# Patient Record
Sex: MALE | Race: WHITE | NOT HISPANIC OR LATINO | Employment: OTHER | URBAN - METROPOLITAN AREA
[De-identification: names, ages, dates, MRNs, and addresses within clinical notes are randomized per-mention and may not be internally consistent; named-entity substitution may affect disease eponyms.]

---

## 2017-07-11 ENCOUNTER — HOSPITAL ENCOUNTER (OUTPATIENT)
Dept: RADIOLOGY | Facility: HOSPITAL | Age: 47
Discharge: HOME | End: 2017-07-11
Attending: SURGERY
Payer: COMMERCIAL

## 2017-07-11 ENCOUNTER — OFFICE VISIT (OUTPATIENT)
Dept: SURGERY | Facility: CLINIC | Age: 47
End: 2017-07-11
Payer: COMMERCIAL

## 2017-07-11 VITALS
HEART RATE: 61 BPM | BODY MASS INDEX: 27.62 KG/M2 | SYSTOLIC BLOOD PRESSURE: 130 MMHG | DIASTOLIC BLOOD PRESSURE: 84 MMHG | HEIGHT: 67 IN | WEIGHT: 176 LBS | TEMPERATURE: 99 F

## 2017-07-11 DIAGNOSIS — N50.89 TESTICULAR MASS: Primary | ICD-10-CM

## 2017-07-11 DIAGNOSIS — R10.32 INGUINAL PAIN OF BOTH SIDES: ICD-10-CM

## 2017-07-11 DIAGNOSIS — R10.31 INGUINAL PAIN OF BOTH SIDES: ICD-10-CM

## 2017-07-11 DIAGNOSIS — Z01.818 PREOP EXAMINATION: ICD-10-CM

## 2017-07-11 DIAGNOSIS — N50.89 TESTICULAR MASS: ICD-10-CM

## 2017-07-11 DIAGNOSIS — K40.20 NON-RECURRENT BILATERAL INGUINAL HERNIA WITHOUT OBSTRUCTION OR GANGRENE: ICD-10-CM

## 2017-07-11 PROCEDURE — 76870 US EXAM SCROTUM: CPT | Mod: 26,,, | Performed by: RADIOLOGY

## 2017-07-11 PROCEDURE — 99203 OFFICE O/P NEW LOW 30 MIN: CPT | Mod: S$GLB,,, | Performed by: SURGERY

## 2017-07-11 PROCEDURE — 99999 PR PBB SHADOW E&M-EST. PATIENT-LVL III: CPT | Mod: PBBFAC,,, | Performed by: SURGERY

## 2017-07-11 PROCEDURE — 76870 US EXAM SCROTUM: CPT | Mod: TC

## 2017-07-11 RX ORDER — HYDROXYZINE HYDROCHLORIDE 25 MG/1
TABLET, FILM COATED ORAL
COMMUNITY
Start: 2017-05-17 | End: 2017-08-11

## 2017-07-11 NOTE — PROGRESS NOTES
History & Physical    SUBJECTIVE:     History of Present Illness:  Patient is a 46 y.o. male presents with bilateral groin pain.  He has had bilateral groin pain in the past possibly associated with reducible masses and was well until July 4th when he played a lot of tennis, cleaned a grill and help with a neighbors yard when he developed pain that worsened over night.  The pain has improved and is 1/10 now and intermittent, changing with body position.  He sees a lump in the groins, greater on the left.      Chief Complaint   Patient presents with    Hernia     [facility]       Review of patient's allergies indicates:   Allergen Reactions    No known allergies        Current Outpatient Prescriptions   Medication Sig Dispense Refill    hydrOXYzine HCl (ATARAX) 25 MG tablet       tadalafil (CIALIS) 5 MG tablet Take 1 tablet (5 mg total) by mouth daily as needed for Erectile Dysfunction. 30 tablet 11     No current facility-administered medications for this visit.        History reviewed. No pertinent past medical history.  Past Surgical History:   Procedure Laterality Date    FOOT SURGERY      NASAL SEPTUM SURGERY      VASECTOMY       Family History   Problem Relation Age of Onset    Diabetes Father     Prostate cancer Neg Hx     Kidney disease Neg Hx      Social History   Substance Use Topics    Smoking status: Never Smoker    Smokeless tobacco: Never Used    Alcohol use Yes      Comment: asim        Review of Systems:  Review of Systems   Constitutional: Negative for fever and unexpected weight change.        Has night sweats and weight gain   Respiratory: Negative for cough, chest tightness and shortness of breath.    Cardiovascular: Negative for chest pain.   Gastrointestinal: Negative for abdominal pain, constipation, diarrhea, nausea and vomiting.   Genitourinary: Negative for difficulty urinating and dysuria.   Skin: Negative for rash and wound.   Neurological: Negative for seizures and headaches.  "  Hematological:        Has easy bruising but no easy bleeding       OBJECTIVE:     Vital Signs (Most Recent)  Temp: 98.6 °F (37 °C) (07/11/17 0825)  Pulse: 61 (07/11/17 0825)  BP: 130/84 (07/11/17 0825)  5' 7" (1.702 m)  79.8 kg (176 lb)     Physical Exam:  Physical Exam   Constitutional: He is oriented to person, place, and time. He appears well-developed and well-nourished.   Neck: Normal range of motion. Neck supple.   Abdominal: Soft. Bowel sounds are normal. He exhibits no distension. There is no tenderness.   Genitourinary: Right testis shows no mass. Left testis shows mass.         Neurological: He is alert and oriented to person, place, and time.   Skin: Skin is warm and dry.   Psychiatric: He has a normal mood and affect. His behavior is normal. Judgment and thought content normal.   Vitals reviewed.      Laboratory  None recent    Diagnostic Results:  None recent    ASSESSMENT/PLAN:     BIH, possible testicular mass    PLAN:Plan     Obtain testicular u/s.  For lap bih with mesh.       "

## 2017-07-14 ENCOUNTER — TELEPHONE (OUTPATIENT)
Dept: SURGERY | Facility: CLINIC | Age: 47
End: 2017-07-14

## 2017-07-14 NOTE — TELEPHONE ENCOUNTER
----- Message from Alfred Burton MD sent at 7/13/2017  5:40 PM CDT -----  Did you let him know his testicular mass is OK?  Otherwise, ready for lap [facility].

## 2017-07-14 NOTE — TELEPHONE ENCOUNTER
Pt notified of test results per Dr. Burton.  We will proceed with [facility] on 8/14/17.

## 2017-08-11 ENCOUNTER — TELEPHONE (OUTPATIENT)
Dept: SURGERY | Facility: CLINIC | Age: 47
End: 2017-08-11

## 2017-08-11 NOTE — TELEPHONE ENCOUNTER
Notified pt that we would be calling in reference to his surgery time after the cases were confirmed.  Pt verbalizes understanding, but would like a call from the preop dept prior to 1 d/t him going into a deposition at that time.   Explained to pt that I would have someone contact him.

## 2017-08-11 NOTE — TELEPHONE ENCOUNTER
----- Message from Brenda Castillo sent at 8/11/2017  8:32 AM CDT -----  Contact: self   Matt States that she needs a call returned by a nurse in reference to his surgery and wants top talk about how he is feeling. Please call Matt  @ [phone]  Or [phone]. Thanks :)

## 2017-08-12 NOTE — PRE-PROCEDURE INSTRUCTIONS
"Spoke with Patient.  NPO, medication, and pre-op instructions reviewed.  Denies previous problems with Anesthesia.  Is requesting to have Bedside Delivery for his post-op medications.  Concerned about post-op constipation.  Suggested that he try a stool softener and/or Miralax.  "I don't like pain."  Verbalized understanding of instructions.    "

## 2017-08-13 ENCOUNTER — ANESTHESIA EVENT (OUTPATIENT)
Dept: SURGERY | Facility: HOSPITAL | Age: 47
End: 2017-08-13
Payer: COMMERCIAL

## 2017-08-13 NOTE — ANESTHESIA PREPROCEDURE EVALUATION
[facility]  Anesthesia Pre-Operative Evaluation         Patient Name: Matt Manley  YOB: 1970  MRN: [id number]    SUBJECTIVE:     Pre-operative evaluation for Procedure(s) (LRB):  REPAIR-HERNIA-INGUINAL-BILATERAL-LAPAROSCOPIC w/ mesh (Bilateral)     08/13/2017    Matt Manley is a 47 y.o. male w/ no significant PMHx of  who presents for the above procedure.    Patient Active Problem List   Diagnosis    Adjustment disorder with mixed anxiety and depressed mood    Inguinal pain of both sides       Review of patient's allergies indicates:   Allergen Reactions    No known allergies        Current Inpatient Medications:      No current facility-administered medications on file prior to encounter.      No current outpatient prescriptions on file prior to encounter.       Past Surgical History:   Procedure Laterality Date    FOOT SURGERY      NASAL SEPTUM SURGERY      VASECTOMY         Social History     Social History    Marital status:      Spouse name: N/A    Number of children: N/A    Years of education: N/A     Occupational History    Not on file.     Social History Main Topics    Smoking status: Never Smoker    Smokeless tobacco: Never Used    Alcohol use Yes      Comment: beers    Drug use: No    Sexual activity: Yes     Partners: Female     Other Topics Concern    Not on file     Social History Narrative    No narrative on file       OBJECTIVE:     Vital Signs Range (Last 24H):         CBC:   No results for input(s): WBC, RBC, HGB, HCT, PLT, MCV, MCH, MCHC in the last 72 hours.    CMP: No results for input(s): NA, K, CL, CO2, BUN, CREATININE, GLU, MG, PHOS, CALCIUM, ALBUMIN, PROT, ALKPHOS, ALT, AST, BILITOT in the last 72 hours.    INR:  No results for input(s): INR, PROTIME, APTT in the last 72 hours.    Invalid input(s): PT    Diagnostic Studies: No relevant studies.    EKG: No recent studies available.    2D ECHO:  No results found for this or  any previous visit.      ASSESSMENT/PLAN:                                                                                                                 Anesthesia Evaluation    I have reviewed the Patient Summary Reports.    I have reviewed the Nursing Notes.   I have reviewed the Medications.     Review of Systems  Anesthesia Hx:  No problems with previous Anesthesia Denies Hx of Anesthetic complications  History of prior surgery of interest to airway management or planning: Denies Family Hx of Anesthesia complications.   Denies Personal Hx of Anesthesia complications.   EENT/Dental:EENT/Dental Normal   Cardiovascular:  Cardiovascular Normal     Pulmonary:  Pulmonary Normal    Renal/:  Renal/ Normal     Hepatic/GI:  Hepatic/GI Normal    Neurological:  Neurology Normal    Psych:   Psychiatric History          Physical Exam  General:  Well nourished    Airway/Jaw/Neck:  Airway Findings: Mouth Opening: Normal Tongue: Normal  Mallampati: II  Improves to I with phonation.        Eyes/Ears/Nose:  EYES/EARS/NOSE FINDINGS: Normal   Dental:  Dental Findings:    Chest/Lungs:  Chest/Lungs Findings: Clear to auscultation, Normal Respiratory Rate     Heart/Vascular:  Heart Findings: Rate: Normal  Rhythm: Regular Rhythm  Sounds: Normal     Abdomen:  Abdomen Findings: Normal    Musculoskeletal:  Musculoskeletal Findings: Normal   Skin:  Skin Findings: Normal    Mental Status:  Mental Status Findings: Normal        Anesthesia Plan  Type of Anesthesia, risks & benefits discussed:  Anesthesia Type:  general  Patient's Preference:   Intra-op Monitoring Plan: standard ASA monitors  Intra-op Monitoring Plan Comments:   Post Op Pain Control Plan: per primary service following discharge from PACU  Post Op Pain Control Plan Comments:   Induction:   IV  Beta Blocker:  Patient is not currently on a Beta-Blocker (No further documentation required).       Informed Consent: Patient understands risks and agrees with Anesthesia plan.   Questions answered. Anesthesia consent signed with patient.  ASA Score: 1     Day of Surgery Review of History & Physical:    H&P update referred to the surgeon.  H&P completed by Anesthesiologist.   Anesthesia Plan Notes:   47M B/L ing hernia for lap repair under GETA        Ready For Surgery From Anesthesia Perspective.

## 2017-08-14 ENCOUNTER — ANESTHESIA (OUTPATIENT)
Dept: SURGERY | Facility: HOSPITAL | Age: 47
End: 2017-08-14
Payer: COMMERCIAL

## 2017-08-14 ENCOUNTER — HOSPITAL ENCOUNTER (OUTPATIENT)
Facility: HOSPITAL | Age: 47
Discharge: HOME | End: 2017-08-14
Attending: SURGERY | Admitting: SURGERY
Payer: COMMERCIAL

## 2017-08-14 VITALS
DIASTOLIC BLOOD PRESSURE: 74 MMHG | HEART RATE: 57 BPM | RESPIRATION RATE: 18 BRPM | BODY MASS INDEX: 26.68 KG/M2 | OXYGEN SATURATION: 100 % | SYSTOLIC BLOOD PRESSURE: 133 MMHG | TEMPERATURE: 98 F | WEIGHT: 170 LBS | HEIGHT: 67 IN

## 2017-08-14 DIAGNOSIS — R10.32 INGUINAL PAIN OF BOTH SIDES: Primary | ICD-10-CM

## 2017-08-14 DIAGNOSIS — K40.20 NON-RECURRENT BILATERAL INGUINAL HERNIA WITHOUT OBSTRUCTION OR GANGRENE: ICD-10-CM

## 2017-08-14 DIAGNOSIS — R10.31 INGUINAL PAIN OF BOTH SIDES: Primary | ICD-10-CM

## 2017-08-14 DIAGNOSIS — K40.90 INGUINAL HERNIA UNILATERAL, NON-RECURRENT: ICD-10-CM

## 2017-08-14 PROCEDURE — 27000221 HC OXYGEN, UP TO 24 HOURS

## 2017-08-14 PROCEDURE — 25000003 PHARM REV CODE 250: Performed by: SURGERY

## 2017-08-14 PROCEDURE — 25000003 PHARM REV CODE 250: Performed by: STUDENT IN AN ORGANIZED HEALTH CARE EDUCATION/TRAINING PROGRAM

## 2017-08-14 PROCEDURE — 63600175 PHARM REV CODE 636 W HCPCS: Performed by: STUDENT IN AN ORGANIZED HEALTH CARE EDUCATION/TRAINING PROGRAM

## 2017-08-14 PROCEDURE — 49650 LAP ING HERNIA REPAIR INIT: CPT | Mod: 50,,, | Performed by: SURGERY

## 2017-08-14 PROCEDURE — 36000709 HC OR TIME LEV III EA ADD 15 MIN: Performed by: SURGERY

## 2017-08-14 PROCEDURE — 71000033 HC RECOVERY, INTIAL HOUR: Performed by: SURGERY

## 2017-08-14 PROCEDURE — D9220A PRA ANESTHESIA: Mod: ,,, | Performed by: ANESTHESIOLOGY

## 2017-08-14 PROCEDURE — 37000008 HC ANESTHESIA 1ST 15 MINUTES: Performed by: SURGERY

## 2017-08-14 PROCEDURE — 71000015 HC POSTOP RECOV 1ST HR: Performed by: SURGERY

## 2017-08-14 PROCEDURE — 36000708 HC OR TIME LEV III 1ST 15 MIN: Performed by: SURGERY

## 2017-08-14 PROCEDURE — 37000009 HC ANESTHESIA EA ADD 15 MINS: Performed by: SURGERY

## 2017-08-14 PROCEDURE — C1781 MESH (IMPLANTABLE): HCPCS | Performed by: SURGERY

## 2017-08-14 PROCEDURE — 27201423 OPTIME MED/SURG SUP & DEVICES STERILE SUPPLY: Performed by: SURGERY

## 2017-08-14 PROCEDURE — 71000016 HC POSTOP RECOV ADDL HR: Performed by: SURGERY

## 2017-08-14 DEVICE — MESH PROLENE 6INX6IN.: Type: IMPLANTABLE DEVICE | Site: GROIN | Status: FUNCTIONAL

## 2017-08-14 RX ORDER — GLYCOPYRROLATE 0.2 MG/ML
INJECTION INTRAMUSCULAR; INTRAVENOUS
Status: DISCONTINUED | OUTPATIENT
Start: 2017-08-14 | End: 2017-08-14

## 2017-08-14 RX ORDER — OXYCODONE AND ACETAMINOPHEN 5; 325 MG/1; MG/1
TABLET ORAL
Qty: 31 TABLET | Refills: 0 | Status: SHIPPED | OUTPATIENT
Start: 2017-08-14 | End: 2020-08-28

## 2017-08-14 RX ORDER — DEXAMETHASONE SODIUM PHOSPHATE 4 MG/ML
INJECTION, SOLUTION INTRA-ARTICULAR; INTRALESIONAL; INTRAMUSCULAR; INTRAVENOUS; SOFT TISSUE
Status: DISCONTINUED | OUTPATIENT
Start: 2017-08-14 | End: 2017-08-14

## 2017-08-14 RX ORDER — SODIUM CHLORIDE 0.9 % (FLUSH) 0.9 %
3 SYRINGE (ML) INJECTION
Status: DISCONTINUED | OUTPATIENT
Start: 2017-08-14 | End: 2017-08-14 | Stop reason: HOSPADM

## 2017-08-14 RX ORDER — SODIUM CHLORIDE 9 MG/ML
INJECTION, SOLUTION INTRAVENOUS CONTINUOUS
Status: DISCONTINUED | OUTPATIENT
Start: 2017-08-14 | End: 2017-08-14 | Stop reason: HOSPADM

## 2017-08-14 RX ORDER — ONDANSETRON 2 MG/ML
4 INJECTION INTRAMUSCULAR; INTRAVENOUS DAILY PRN
Status: DISCONTINUED | OUTPATIENT
Start: 2017-08-14 | End: 2017-08-14 | Stop reason: HOSPADM

## 2017-08-14 RX ORDER — LIDOCAINE HCL/PF 100 MG/5ML
SYRINGE (ML) INTRAVENOUS
Status: DISCONTINUED | OUTPATIENT
Start: 2017-08-14 | End: 2017-08-14

## 2017-08-14 RX ORDER — CEFAZOLIN SODIUM 1 G/3ML
INJECTION, POWDER, FOR SOLUTION INTRAMUSCULAR; INTRAVENOUS
Status: DISCONTINUED | OUTPATIENT
Start: 2017-08-14 | End: 2017-08-14

## 2017-08-14 RX ORDER — ACETAMINOPHEN 10 MG/ML
INJECTION, SOLUTION INTRAVENOUS
Status: DISCONTINUED | OUTPATIENT
Start: 2017-08-14 | End: 2017-08-14

## 2017-08-14 RX ORDER — ONDANSETRON 2 MG/ML
4 INJECTION INTRAMUSCULAR; INTRAVENOUS EVERY 12 HOURS PRN
Status: DISCONTINUED | OUTPATIENT
Start: 2017-08-14 | End: 2017-08-14 | Stop reason: HOSPADM

## 2017-08-14 RX ORDER — POLYETHYLENE GLYCOL 3350 17 G/17G
17 POWDER, FOR SOLUTION ORAL DAILY
Qty: 119 G | Refills: 0 | Status: SHIPPED | OUTPATIENT
Start: 2017-08-14 | End: 2020-08-28

## 2017-08-14 RX ORDER — OXYCODONE HYDROCHLORIDE 5 MG/1
5 TABLET ORAL EVERY 4 HOURS PRN
Status: DISCONTINUED | OUTPATIENT
Start: 2017-08-14 | End: 2017-08-14 | Stop reason: HOSPADM

## 2017-08-14 RX ORDER — ROCURONIUM BROMIDE 10 MG/ML
INJECTION, SOLUTION INTRAVENOUS
Status: DISCONTINUED | OUTPATIENT
Start: 2017-08-14 | End: 2017-08-14

## 2017-08-14 RX ORDER — NEOSTIGMINE METHYLSULFATE 1 MG/ML
INJECTION, SOLUTION INTRAVENOUS
Status: DISCONTINUED | OUTPATIENT
Start: 2017-08-14 | End: 2017-08-14

## 2017-08-14 RX ORDER — FENTANYL CITRATE 50 UG/ML
INJECTION, SOLUTION INTRAMUSCULAR; INTRAVENOUS
Status: DISCONTINUED | OUTPATIENT
Start: 2017-08-14 | End: 2017-08-14

## 2017-08-14 RX ORDER — KETOROLAC TROMETHAMINE 30 MG/ML
INJECTION, SOLUTION INTRAMUSCULAR; INTRAVENOUS
Status: DISCONTINUED | OUTPATIENT
Start: 2017-08-14 | End: 2017-08-14

## 2017-08-14 RX ORDER — MIDAZOLAM HYDROCHLORIDE 1 MG/ML
INJECTION, SOLUTION INTRAMUSCULAR; INTRAVENOUS
Status: DISCONTINUED | OUTPATIENT
Start: 2017-08-14 | End: 2017-08-14

## 2017-08-14 RX ORDER — LIDOCAINE HYDROCHLORIDE 10 MG/ML
1 INJECTION, SOLUTION EPIDURAL; INFILTRATION; INTRACAUDAL; PERINEURAL ONCE
Status: DISCONTINUED | OUTPATIENT
Start: 2017-08-14 | End: 2017-08-14 | Stop reason: HOSPADM

## 2017-08-14 RX ORDER — FENTANYL CITRATE 50 UG/ML
25 INJECTION, SOLUTION INTRAMUSCULAR; INTRAVENOUS EVERY 5 MIN PRN
Status: DISCONTINUED | OUTPATIENT
Start: 2017-08-14 | End: 2017-08-14 | Stop reason: HOSPADM

## 2017-08-14 RX ORDER — PROPOFOL 10 MG/ML
VIAL (ML) INTRAVENOUS
Status: DISCONTINUED | OUTPATIENT
Start: 2017-08-14 | End: 2017-08-14

## 2017-08-14 RX ORDER — OXYCODONE HYDROCHLORIDE 5 MG/1
10 TABLET ORAL EVERY 4 HOURS PRN
Status: DISCONTINUED | OUTPATIENT
Start: 2017-08-14 | End: 2017-08-14 | Stop reason: HOSPADM

## 2017-08-14 RX ORDER — BUPIVACAINE HYDROCHLORIDE 2.5 MG/ML
INJECTION, SOLUTION EPIDURAL; INFILTRATION; INTRACAUDAL
Status: DISCONTINUED | OUTPATIENT
Start: 2017-08-14 | End: 2017-08-14 | Stop reason: HOSPADM

## 2017-08-14 RX ORDER — ONDANSETRON 2 MG/ML
INJECTION INTRAMUSCULAR; INTRAVENOUS
Status: DISCONTINUED | OUTPATIENT
Start: 2017-08-14 | End: 2017-08-14

## 2017-08-14 RX ADMIN — LIDOCAINE HYDROCHLORIDE 40 MG: 20 INJECTION, SOLUTION INTRAVENOUS at 08:08

## 2017-08-14 RX ADMIN — DEXAMETHASONE SODIUM PHOSPHATE 8 MG: 4 INJECTION, SOLUTION INTRAMUSCULAR; INTRAVENOUS at 09:08

## 2017-08-14 RX ADMIN — SODIUM CHLORIDE: 0.9 INJECTION, SOLUTION INTRAVENOUS at 08:08

## 2017-08-14 RX ADMIN — SODIUM CHLORIDE, SODIUM GLUCONATE, SODIUM ACETATE, POTASSIUM CHLORIDE, MAGNESIUM CHLORIDE, SODIUM PHOSPHATE, DIBASIC, AND POTASSIUM PHOSPHATE: .53; .5; .37; .037; .03; .012; .00082 INJECTION, SOLUTION INTRAVENOUS at 09:08

## 2017-08-14 RX ADMIN — NEOSTIGMINE METHYLSULFATE 4 MG: 1 INJECTION INTRAVENOUS at 10:08

## 2017-08-14 RX ADMIN — KETOROLAC TROMETHAMINE 30 MG: 30 INJECTION, SOLUTION INTRAMUSCULAR; INTRAVENOUS at 09:08

## 2017-08-14 RX ADMIN — PROPOFOL 150 MG: 10 INJECTION, EMULSION INTRAVENOUS at 08:08

## 2017-08-14 RX ADMIN — ONDANSETRON 4 MG: 2 INJECTION INTRAMUSCULAR; INTRAVENOUS at 09:08

## 2017-08-14 RX ADMIN — MIDAZOLAM HYDROCHLORIDE 2 MG: 1 INJECTION, SOLUTION INTRAMUSCULAR; INTRAVENOUS at 08:08

## 2017-08-14 RX ADMIN — OXYCODONE HYDROCHLORIDE 10 MG: 5 TABLET ORAL at 10:08

## 2017-08-14 RX ADMIN — ROCURONIUM BROMIDE 15 MG: 10 INJECTION, SOLUTION INTRAVENOUS at 09:08

## 2017-08-14 RX ADMIN — EPHEDRINE SULFATE 5 MG: 50 INJECTION, SOLUTION INTRAMUSCULAR; INTRAVENOUS; SUBCUTANEOUS at 10:08

## 2017-08-14 RX ADMIN — SODIUM CHLORIDE: 0.9 INJECTION, SOLUTION INTRAVENOUS at 12:08

## 2017-08-14 RX ADMIN — FENTANYL CITRATE 25 MCG: 50 INJECTION INTRAMUSCULAR; INTRAVENOUS at 10:08

## 2017-08-14 RX ADMIN — ROCURONIUM BROMIDE 30 MG: 10 INJECTION, SOLUTION INTRAVENOUS at 08:08

## 2017-08-14 RX ADMIN — ACETAMINOPHEN 1000 MG: 10 INJECTION, SOLUTION INTRAVENOUS at 09:08

## 2017-08-14 RX ADMIN — EPHEDRINE SULFATE 5 MG: 50 INJECTION, SOLUTION INTRAMUSCULAR; INTRAVENOUS; SUBCUTANEOUS at 09:08

## 2017-08-14 RX ADMIN — GLYCOPYRROLATE 0.4 MG: 0.2 INJECTION, SOLUTION INTRAMUSCULAR; INTRAVENOUS at 10:08

## 2017-08-14 RX ADMIN — CEFAZOLIN 2 G: 1 INJECTION, POWDER, FOR SOLUTION INTRAVENOUS at 09:08

## 2017-08-14 RX ADMIN — FENTANYL CITRATE 100 MCG: 50 INJECTION, SOLUTION INTRAMUSCULAR; INTRAVENOUS at 08:08

## 2017-08-14 NOTE — TRANSFER OF CARE
"Anesthesia Transfer of Care Note    Patient: Matt Manley    Procedure(s) Performed: Procedure(s) (LRB):  REPAIR-HERNIA-INGUINAL-BILATERAL-LAPAROSCOPIC w/ mesh (Bilateral)    Patient location: PACU    Anesthesia Type: general    Transport from OR: Transported from OR on 6-10 L/min O2 by face mask with adequate spontaneous ventilation    Post pain: adequate analgesia    Post assessment: no apparent anesthetic complications    Post vital signs: stable    Level of consciousness: awake and alert    Nausea/Vomiting: no nausea/vomiting    Complications: none    Transfer of care protocol was followed      Last vitals:   Visit Vitals  /79 (BP Location: Left arm, Patient Position: Lying)   Pulse (!) 59   Temp 36.7 °C (98 °F)   Resp 18   Ht 5' 7" (1.702 m)   Wt 77.1 kg (170 lb)   SpO2 98%   BMI 26.63 kg/m²     "

## 2017-08-14 NOTE — DISCHARGE INSTRUCTIONS
Outpatient post-operative instructions:    Diet: many patients are nauseas the night of surgery.  Please stay on liquids until you are hungry for solid food which is usually the day after surgery.    Wounds: There are tape strips directly on your skin called steri-strips with a dressing or Band-Aids on top.  Remove the dressing or Band-Aids 2 days after surgery.  Please leave the steri-strips on until they fall off.  If they have not fallen off they will be removed in clinic.    Bathing: Please do not take any baths until after being seen by you doctor.  You may shower after the bandaids have been removed.  It is ok for the shower water to run onto the wounds.  Please pat them dry.  Activity:  For most laparoscopic cases patients must be light duty for 2 weeks.  If you have had a hernia repair you must continue light duty for 6 weeks.  Light duty means no lifting over 10 pounds (about a gallon of milk) or equivalent activity.  No golfing except putting, no fishing, no vacuuming and no mowing.  For exercise please keep to light exercise only.  No weight lifting or strenuous exercise.  Walking as much as you are comfortable to do is ok.  Driving is ok once you feel fully alert, off pain medication and have no pain bending or twisting.  Sex is ok once the pain has subsided and as long as it is not strenuous while you are on light duty.    Work: You may do light duty work when you feel up to it.  For most patients that is 1-2 weeks after surgery.  This is light duty only until you are off restrictions.        Discharge Instructions for Open Hernia Repair  You had a procedure called open hernia repair. Also called a rupture, a hernia is a tear or weakness in the wall of the belly. This weakness may be present at birth. Or it can be caused by the wear and tear of daily living. Hernias may get worse with time or with physical stress. But surgery can help repair the weakness and eliminate symptoms.  Activity after  surgery  Recommendations include the following:  · After surgery, take it easy for the rest of the day. If you had general anesthesia, dont use machinery or power tools, drink alcohol, or make any major decisions for at least the first 24 hours.  · Dont drive while you are still taking opioid pain medicine, and dont drive until you are able to step firmly on the brake pedal without hesitation.  · Ask others to help with chores and errands while you recover.  · Dont lift anything heavier than 10 pounds until your healthcare provider says its OK.  · Dont mow the lawn, use a vacuum , or do other strenuous activities until your healthcare provider says its OK.  · Walk as often as you feel able.  · Continue the coughing and deep breathing exercises that you learned in the hospital.  · Ask your healthcare provider when you can expect to return to work.  · Avoid constipation:  ¨ Eat fruits, vegetables, and whole grains.  ¨ Drink 6 to 8 glasses of water a day, unless otherwise instructed.  ¨ Use a laxative or a mild stool softener as instructed by your healthcare provider.  Bandage and incision care  Tips include the following:  · Do not get the bandage or wound wet for 48 hours.  · If strips of tape were used to close your incision, dont pull them off. Let them fall off on their own.  · Remove any gauze bandage in 48 hours.  · Wash your incision with mild soap and water. Pat it dry. Dont use oils, powders, or lotions on your incision. Do not soak your incision or take tub baths until cleared by your healthcare provider.  Follow-up care  Keep follow-up appointments during your recovery. These allow your healthcare provider to check your progress and make sure youre healing well. You may also need to have your stitches, staples, or bandage removed. During office visits, tell your healthcare provider if you have any new symptoms. And be sure to ask any questions you have.     When to call your healthcare  provider  Call your healthcare provider immediately if you have any of the following:  · A large amount of swelling or bruising (some testicular swelling and bruising is common)  · Bleeding  · Increasing pain  · Increased redness or drainage of the incision  · Fever of 100.4°F (38.0°C) or higher, or as directed by your healthcare provider  · Trouble urinating  · Nausea or vomiting   Date Last Reviewed: 7/1/2016  © 4423-8682 [employer]. [address] [place] [address], [place] [id number]. All rights reserved. This information is not intended as a substitute for professional medical care. Always follow your healthcare professional's instructions.

## 2017-08-14 NOTE — OP NOTE
DATE OF PROCEDURE: 8/14/2017    PRE OP DIAGNOSIS: Non-recurrent bilateral inguinal hernia without obstruction or gangrene [K40.20]    POST OP DIAGNOSIS: Non-recurrent bilateral inguinal hernia without obstruction or gangrene [K40.20]    PROCEDURE: Procedure(s) (LRB):  REPAIR-HERNIA-INGUINAL-BILATERAL-LAPAROSCOPIC w/ mesh (Bilateral)    Surgeon(s) and Role:     * Alfred Burton MD - Primary     * Rossy Juarez MD - Resident - Assisting  ANESTHESIA: General.     PROCEDURE IN DETAIL: The patient was placed under general anesthesia. [place]   was placed. The abdomen was prepped and draped in the usual manner. Access to   the preperitoneal space was gained by making an incision in the inferior   umbilicus, bluntly dissecting down to the rectus sheath, sharply incising   this and bluntly dissecting the rectus muscle to the posterior sheath and   placing a balloon dilator from this point to the pubic symphysis and dilating   under direct vision to less than 30 puffs. The balloon was removed, the trocar was left   in its place and pneumopreperitoneum to 8 mm of CO2 gas was obtained. Two 5 mm   trocars were placed in the lower midline under direct vision. Pubic symphysis   and ramus were dissected free on both sides. This was taken to the anterior   superior iliac spine on both sides. There were bilateral direct hernias, bilateral lipomas of cord, no indirect hernias and no femoral hernias.  These were brought deeply and freely into the preperitoneal space. 2 4 x 6 inch keyhole meshes were fashioned out of Prolene, placed in antibiotic solution, placed in the preperitoneal space and unfolded around the cord. They were tacked to close the keyhole on the pubic tubercle and ramus   along their most anterior borders and to the midline to each other. no overlay patch was placed. The preperitoneum was inspected for hemostasis. Trocars were removed under direct vision. Prior to removing the last trocar, pneumopreperitoneum  was allowed to escape and 20 mL of Marcaine solution instilled. The fascia at the naval was closed with 0-Vicryl.  Skin incisions were closed with 4-0 plain catgut, and reinforced with Mastisol, Steri-Strips and Band-Aids. The patient tolerated the procedure well and was brought to Recovery Room in stable condition. Sponge and needle counts were correct at the end of the case.    Blood loss: min Pathology: none Complications: none    Alfred Burton MD

## 2017-08-14 NOTE — BRIEF OP NOTE
Operative Note       Surgery Date: 8/14/2017     Surgeon(s) and Role:     * Alfred Burton MD - Primary     * Rossy Juarez MD - Resident - Assisting    Pre-op Diagnosis:  Non-recurrent bilateral inguinal hernia without obstruction or gangrene [K40.20]    Post-op Diagnosis:  Non-recurrent bilateral inguinal hernia without obstruction or gangrene [K40.20]    Procedure(s) (LRB):  REPAIR-HERNIA-INGUINAL-BILATERAL-LAPAROSCOPIC w/ mesh (Bilateral)    Anesthesia: General    Procedure in Detail/Findings:  Bih, repair with mesh.    Estimated Blood Loss: Minimal           Specimens     None        Implants:   Implant Name Type Inv. Item Serial No.  Lot No. LRB No. Used   MESH PROLENE [id number]. - [id number]   MESH PROLENE [id number].   [employer] [id number] Bilateral 2              Disposition: PACU - hemodynamically stable.           Condition: Good    Attestation:  I was present and scrubbed for the entire procedure.

## 2017-08-14 NOTE — H&P
History of Present Illness:  Patient is a 46 y.o. male presents with bilateral groin pain.  He has had bilateral groin pain in the past possibly associated with reducible masses and was well until July 4th when he played a lot of tennis, cleaned a grill and help with a neighbors yard when he developed pain that worsened over night.  The pain has improved and is 1/10 now and intermittent, changing with body position.  He sees a lump in the groins, greater on the left.  Since I last saw him he continues to have pain with activity.    History reviewed. No pertinent past medical history.    Past Surgical History:   Procedure Laterality Date    FOOT SURGERY      NASAL SEPTUM SURGERY      VASECTOMY         Family History   Problem Relation Age of Onset    Diabetes Father     Prostate cancer Neg Hx     Kidney disease Neg Hx        Social History     Social History    Marital status:      Spouse name: N/A    Number of children: N/A    Years of education: N/A     Social History Main Topics    Smoking status: Never Smoker    Smokeless tobacco: Never Used    Alcohol use Yes      Comment: beers    Drug use: No    Sexual activity: Yes     Partners: Female     Other Topics Concern    None     Social History Narrative    None       Current Facility-Administered Medications   Medication Dose Route Frequency Provider Last Rate Last Dose    0.9%  NaCl infusion   Intravenous Continuous Alfred Burton MD        CEFAZOLIN 2G/20 ML SYRINGE (PYXIS) IV syringe 2 g 20 mL  2 g Intravenous On Call Procedure Alfred Burton MD        lidocaine (PF) 10 mg/ml (1%) injection 10 mg  1 mL Intradermal Once Jose Turner MD           Review of patient's allergies indicates:   Allergen Reactions    No known allergies        PE: chest clear heart rrr abdomen benign    Impression: bih    Plan: lap bih with mesh

## 2017-08-14 NOTE — PROGRESS NOTES
AT BEDSIDE. PATIENTS HR IN MID TO HIGH 40'S. PREOP HR 59.  OK WITH HR. PATIENT IS ASYMPTOMATIC. NADN. NO N/V, CP, C/O MINIMAL PAIN TO ABDOMEN.

## 2017-08-14 NOTE — DISCHARGE SUMMARY
[facility]  General Surgery  Discharge Summary      Patient Name: Matt Manley  MRN: [id number]  Admission Date: 8/14/2017  Hospital Length of Stay: 0 days  Discharge Date and Time:  08/14/2017 1:49 PM  Attending Physician: Alfred Burton MD   Discharging Provider: Rossy Juarez MD  Primary Care Provider: Jose Alfredo Moreno MD (Inactive)     HPI: admitted for surgery    Procedure(s) (LRB):  REPAIR-HERNIA-INGUINAL-BILATERAL-LAPAROSCOPIC w/ mesh (Bilateral)     Hospital Course: Pt tolerated procedure well and had an uncomplicated post op course.      Consults:     Significant Diagnostic Studies: none    Pending Diagnostic Studies:     None        Final Active Diagnoses:    Diagnosis Date Noted POA    PRINCIPAL PROBLEM:  Inguinal hernia unilateral, non-recurrent [K40.90] 08/14/2017 Yes      Problems Resolved During this Admission:    Diagnosis Date Noted Date Resolved POA      Discharged Condition: good    Disposition: Home or Self Care    Follow Up:  Follow-up Information     Alfred Burton MD. Schedule an appointment as soon as possible for a visit in 2 weeks.    Specialties:  General Surgery, Bariatrics  Why:  For wound re-check  Contact information:  [address] [place] [id number]  [phone]                 Patient Instructions:     Diet general     Activity as tolerated     Shower on day dressing removed (No bath)     Lifting restrictions   Order Comments: No lifting greater than 10 pounds  No pushing/pulling such as vacuuming or raking.  No straining, avoid constipation and take stool softeners as described and laxatives as needed.  No driving while on narcotics and until you can react quickly without pain.     Call MD for:  temperature >100.4     Call MD for:  persistent nausea and vomiting     Call MD for:  severe uncontrolled pain     Call MD for:  difficulty breathing, headache or visual disturbances     Call MD for:  redness, tenderness, or signs of  infection (pain, swelling, redness, odor or green/yellow discharge around incision site)     Call MD for:  hives     Call MD for:  persistent dizziness or light-headedness     Call MD for:  extreme fatigue     Remove dressing in 48 hours   Order Comments: Steri strips will fall off on their own.       Medications:  Reconciled Home Medications:   Current Discharge Medication List      START taking these medications    Details   oxycodone-acetaminophen (PERCOCET) 5-325 mg per tablet Take one tablet by mouth every four hours as needed for pain  Qty: 31 tablet, Refills: 0      polyethylene glycol (GLYCOLAX) 17 gram/dose powder Take 17 g by mouth once daily.  Qty: 119 g, Refills: 0             Rossy Juarez MD  General Surgery  [facility]

## 2017-08-15 NOTE — ANESTHESIA POSTPROCEDURE EVALUATION
"Anesthesia Post Evaluation    Patient: Matt Manley    Procedure(s) Performed: Procedure(s) (LRB):  REPAIR-HERNIA-INGUINAL-BILATERAL-LAPAROSCOPIC w/ mesh (Bilateral)    Final Anesthesia Type: general  Patient location during evaluation: PACU  Patient participation: Yes- Able to Participate  Level of consciousness: awake and alert  Pain management: adequate  Airway patency: patent  PONV status at discharge: No PONV  Anesthetic complications: no      Cardiovascular status: blood pressure returned to baseline  Respiratory status: unassisted, spontaneous ventilation and room air  Hydration status: euvolemic  Follow-up not needed.        Visit Vitals  /74 (BP Location: Right arm, Patient Position: Lying)   Pulse (!) 57   Temp 36.7 °C (98 °F) (Temporal)   Resp 18   Ht 5' 7" (1.702 m)   Wt 77.1 kg (170 lb)   SpO2 100%   BMI 26.63 kg/m²       Pain/Gladys Score: Pain Assessment Performed: Yes (8/14/2017  1:30 PM)  Presence of Pain: other (see comments) (patient states pain is tolerable) (8/14/2017  1:30 PM)  Pain Rating Prior to Med Admin: 6 (8/14/2017 10:49 AM)  Pain Rating Post Med Admin: 4 (8/14/2017 11:00 AM)  Gladys Score: 10 (8/14/2017  1:30 PM)      "

## 2017-08-29 ENCOUNTER — OFFICE VISIT (OUTPATIENT)
Dept: SURGERY | Facility: CLINIC | Age: 47
End: 2017-08-29
Payer: COMMERCIAL

## 2017-08-29 VITALS
SYSTOLIC BLOOD PRESSURE: 140 MMHG | BODY MASS INDEX: 27.47 KG/M2 | WEIGHT: 175 LBS | HEART RATE: 66 BPM | DIASTOLIC BLOOD PRESSURE: 93 MMHG | HEIGHT: 67 IN

## 2017-08-29 DIAGNOSIS — Z09 POSTOP CHECK: Primary | ICD-10-CM

## 2017-08-29 PROBLEM — R10.32 INGUINAL PAIN OF BOTH SIDES: Status: RESOLVED | Noted: 2017-07-11 | Resolved: 2017-08-29

## 2017-08-29 PROBLEM — K40.90 INGUINAL HERNIA UNILATERAL, NON-RECURRENT: Status: RESOLVED | Noted: 2017-08-14 | Resolved: 2017-08-29

## 2017-08-29 PROBLEM — R10.31 INGUINAL PAIN OF BOTH SIDES: Status: RESOLVED | Noted: 2017-07-11 | Resolved: 2017-08-29

## 2017-08-29 PROCEDURE — 99999 PR PBB SHADOW E&M-EST. PATIENT-LVL III: CPT | Mod: PBBFAC,,, | Performed by: SURGERY

## 2017-08-29 PROCEDURE — 99024 POSTOP FOLLOW-UP VISIT: CPT | Mod: S$GLB,,, | Performed by: SURGERY

## 2017-08-29 NOTE — PROGRESS NOTES
"Matt Manley is a 47 y.o. male patient.   1. Postop check      History reviewed. No pertinent past medical history.  Past Surgical History Pertinent Negatives:   Procedure Date Noted    PROSTATE SURGERY 09/01/2015     Scheduled Meds:  Continuous Infusions:  PRN Meds:    Review of patient's allergies indicates:   Allergen Reactions    No known allergies      There are no hospital problems to display for this patient.    Blood pressure (!) 140/93, pulse 66, height 5' 7" (1.702 m), weight 79.4 kg (175 lb).    Subjective S/p lap bih with mesh 8/14/17.  He has no complaints.  Objective Abdomen benign, wounds clear, no hernias, small seroma on left.   Assessment & Plan He is happy with his procedure.  Light duty one more month and follow up one prn.       Alfred Burton MD  8/29/2017  "

## 2017-09-06 ENCOUNTER — PATIENT MESSAGE (OUTPATIENT)
Dept: SURGERY | Facility: CLINIC | Age: 47
End: 2017-09-06

## 2017-10-19 ENCOUNTER — PATIENT MESSAGE (OUTPATIENT)
Dept: ADMINISTRATIVE | Facility: OTHER | Age: 47
End: 2017-10-19

## 2020-02-01 ENCOUNTER — OFFICE VISIT (OUTPATIENT)
Dept: URGENT CARE | Facility: CLINIC | Age: 50
End: 2020-02-01
Payer: COMMERCIAL

## 2020-02-01 VITALS
RESPIRATION RATE: 18 BRPM | HEART RATE: 76 BPM | HEIGHT: 67 IN | SYSTOLIC BLOOD PRESSURE: 124 MMHG | OXYGEN SATURATION: 100 % | TEMPERATURE: 99 F | DIASTOLIC BLOOD PRESSURE: 77 MMHG | WEIGHT: 180 LBS | BODY MASS INDEX: 28.25 KG/M2

## 2020-02-01 DIAGNOSIS — R05.9 COUGH: ICD-10-CM

## 2020-02-01 DIAGNOSIS — J10.1 INFLUENZA B: Primary | ICD-10-CM

## 2020-02-01 LAB
CTP QC/QA: YES
FLUAV AG NPH QL: NEGATIVE
FLUBV AG NPH QL: POSITIVE

## 2020-02-01 PROCEDURE — 99214 OFFICE O/P EST MOD 30 MIN: CPT | Mod: 25,S$GLB,, | Performed by: NURSE PRACTITIONER

## 2020-02-01 PROCEDURE — 87804 POCT INFLUENZA A/B: ICD-10-PCS | Mod: QW,S$GLB,, | Performed by: NURSE PRACTITIONER

## 2020-02-01 PROCEDURE — 99214 PR OFFICE/OUTPT VISIT, EST, LEVL IV, 30-39 MIN: ICD-10-PCS | Mod: 25,S$GLB,, | Performed by: NURSE PRACTITIONER

## 2020-02-01 PROCEDURE — 87804 INFLUENZA ASSAY W/OPTIC: CPT | Mod: QW,S$GLB,, | Performed by: NURSE PRACTITIONER

## 2020-02-01 RX ORDER — PROMETHAZINE HYDROCHLORIDE AND CODEINE PHOSPHATE 6.25; 1 MG/5ML; MG/5ML
5 SOLUTION ORAL EVERY 6 HOURS PRN
Qty: 60 ML | Refills: 0 | Status: SHIPPED | OUTPATIENT
Start: 2020-02-01 | End: 2020-08-28

## 2020-02-01 RX ORDER — TRIPROLIDINE/PSEUDOEPHEDRINE 2.5MG-60MG
600 TABLET ORAL
Status: COMPLETED | OUTPATIENT
Start: 2020-02-01 | End: 2020-02-01

## 2020-02-01 RX ORDER — OSELTAMIVIR PHOSPHATE 75 MG/1
75 CAPSULE ORAL 2 TIMES DAILY
Qty: 10 CAPSULE | Refills: 0 | Status: SHIPPED | OUTPATIENT
Start: 2020-02-01 | End: 2020-02-06

## 2020-02-01 RX ADMIN — Medication 600 MG: at 11:02

## 2020-02-01 NOTE — PROGRESS NOTES
"Subjective:       Patient ID: Matt Manley is a 49 y.o. male.    Vitals:  height is 5' 7" (1.702 m) and weight is 81.6 kg (180 lb). His oral temperature is 99.4 °F (37.4 °C). His blood pressure is 124/77 and his pulse is 76. His respiration is 18 and oxygen saturation is 100%.     Chief Complaint: URI    This is a 49 y.o. male who presents today with a chief complaint of chest congestion with coughing, sinus headaches, ear pain, sore throat and fever of 100.0 F. Took Delsym 12 hour cough syrup and Advil. No flu vaccine. He wants to rule out the flu. Symptoms since 1/29/20.    URI    This is a new problem. The current episode started in the past 7 days. The problem has been gradually worsening. The maximum temperature recorded prior to his arrival was 100.4 - 100.9 F. Associated symptoms include congestion, coughing, ear pain, headaches, a plugged ear sensation, sinus pain, a sore throat and wheezing. Pertinent negatives include no diarrhea, dysuria, nausea, rash, rhinorrhea, sneezing or vomiting. He has tried NSAIDs and increased fluids (Gatorade) for the symptoms. The treatment provided mild relief.       Constitution: Positive for activity change, chills, sweating, fatigue, fever and generalized weakness. Negative for appetite change and international travel in last 60 days.   HENT: Positive for ear pain, congestion, postnasal drip, sinus pain, sinus pressure and sore throat. Negative for tinnitus and voice change.    Neck: Negative for painful lymph nodes.   Cardiovascular: Positive for sob on exertion.   Eyes: Negative for eye redness.   Respiratory: Positive for cough and wheezing. Negative for chest tightness, sputum production, bloody sputum, COPD, shortness of breath, stridor and asthma.    Gastrointestinal: Negative for nausea, vomiting and diarrhea.   Genitourinary: Negative for dysuria.   Musculoskeletal: Negative for muscle ache.   Skin: Negative for rash.   Allergic/Immunologic: Negative for " seasonal allergies, asthma, sneezing, immunizations up-to-date and flu shot.   Neurological: Positive for dizziness, light-headedness and headaches. Negative for altered mental status.   Hematologic/Lymphatic: Negative for swollen lymph nodes.   Psychiatric/Behavioral: Negative for altered mental status and confusion.       Objective:      Physical Exam   Constitutional: He is oriented to person, place, and time. He appears well-developed and well-nourished. He is cooperative.  Non-toxic appearance. He does not have a sickly appearance. He appears ill. No distress.   HENT:   Head: Normocephalic and atraumatic.   Right Ear: Hearing, tympanic membrane, external ear and ear canal normal.   Left Ear: Hearing, tympanic membrane, external ear and ear canal normal.   Nose: Nose normal. No mucosal edema, rhinorrhea or nasal deformity. No epistaxis. Right sinus exhibits no maxillary sinus tenderness and no frontal sinus tenderness. Left sinus exhibits no maxillary sinus tenderness and no frontal sinus tenderness.   Mouth/Throat: Uvula is midline, oropharynx is clear and moist and mucous membranes are normal. No trismus in the jaw. Normal dentition. No uvula swelling. No oropharyngeal exudate, posterior oropharyngeal edema or posterior oropharyngeal erythema.   Eyes: Conjunctivae and lids are normal. No scleral icterus.   Neck: Trachea normal, full passive range of motion without pain and phonation normal. Neck supple. No neck rigidity. No edema and no erythema present.   Cardiovascular: Normal rate, regular rhythm, normal heart sounds, intact distal pulses and normal pulses.   Pulmonary/Chest: Effort normal and breath sounds normal. No respiratory distress. He has no decreased breath sounds. He has no wheezes. He has no rhonchi.   Abdominal: Normal appearance.   Musculoskeletal: Normal range of motion. He exhibits no edema or deformity.   Neurological: He is alert and oriented to person, place, and time. He exhibits normal  muscle tone. Coordination normal.   Skin: Skin is warm, dry, intact, not diaphoretic, not pale and no rash.   Psychiatric: He has a normal mood and affect. His speech is normal and behavior is normal. Judgment and thought content normal. Cognition and memory are normal.   Nursing note and vitals reviewed.        Assessment:       1. Influenza B    2. Cough        Plan:         Influenza B  -     ibuprofen 100 mg/5 mL suspension 600 mg  -     promethazine-codeine 6.25-10 mg/5 ml (PHENERGAN WITH CODEINE) 6.25-10 mg/5 mL syrup; Take 5 mLs by mouth every 6 (six) hours as needed for Cough.  Dispense: 60 mL; Refill: 0  -     oseltamivir (TAMIFLU) 75 MG capsule; Take 1 capsule (75 mg total) by mouth 2 (two) times daily. for 5 days  Dispense: 10 capsule; Refill: 0    Cough  -     POCT Influenza A/B  -     promethazine-codeine 6.25-10 mg/5 ml (PHENERGAN WITH CODEINE) 6.25-10 mg/5 mL syrup; Take 5 mLs by mouth every 6 (six) hours as needed for Cough.  Dispense: 60 mL; Refill: 0

## 2020-02-01 NOTE — PATIENT INSTRUCTIONS
Return to Urgent Care or go to ER if symptoms worsen or fail to improve.  Follow up with PCP as recommended for further management.     Use pseudoephedrine (behind the counter) to decongest-- (the little red pills).  Pseudoephedrine 30 mg up to 240 mg /day. It can raise your blood pressure and give you palpitations.  Do not buy any oral medications with phenylephrine as it has not been proven effective as a decongestant at the OTC dose.     Take Ibuprofen or Acetaminophen according to label instructions for fever, throat pain, headache, and body aches    Use warm salt water gargles to ease your throat pain. Warm salt water gargles as needed for sore throat-  1/2 tsp salt to 1 cup warm water, gargle as desired.    Sometimes Nyquil at night is beneficial to help you get some rest, however it is sedating and does have an antihistamine, as well as tylenol.  The Nyquil behind the pharmacy counter also has the decongestant, pseudoephedrine as an additional ingredient.       Influenza (Adult)    Influenza is also called the flu. It is a viral illness that affects the air passages of your lungs. It is different from the common cold. The flu can easily be passed from one to person to another. It may be spread through the air by coughing and sneezing. Or it can be spread by touching the sick person and then touching your own eyes, nose, or mouth.  The flu starts 1 to 3 days after you are exposed to the flu virus. It may last for 1 to 2 weeks but many people feel tired or fatigued for many weeks afterward. You usually dont need to take antibiotics unless you have a complication. This might be an ear or sinus infection or pneumonia.  Symptoms of the flu may be mild or severe. They can include extreme tiredness (wanting to stay in bed all day), chills, fevers, muscle aches, soreness with eye movement, headache, and a dry, hacking cough.  Home care  Follow these guidelines when caring for yourself at home:  · Avoid being around  cigarette smoke, whether yours or other peoples.  · Acetaminophen or ibuprofen will help ease your fever, muscle aches, and headache. Dont give aspirin to anyone younger than 18 who has the flu. Aspirin can harm the liver.  · Nausea and loss of appetite are common with the flu. Eat light meals. Drink 6 to 8 glasses of liquids every day. Good choices are water, sport drinks, soft drinks without caffeine, juices, tea, and soup. Extra fluids will also help loosen secretions in your nose and lungs.  · Over-the-counter cold medicines will not make the flu go away faster. But the medicines may help with coughing, sore throat, and congestion in your nose and sinuses. Dont use a decongestant if you have high blood pressure.  · Stay home until your fever has been gone for at least 24 hours without using medicine to reduce fever.  Follow-up care  Follow up with your healthcare provider, or as advised, if you are not getting better over the next week.  If you are age 65 or older, talk with your provider about getting a pneumococcal vaccine every 5 years. You should also get this vaccine if you have chronic asthma or COPD. All adults should get a flu vaccine every fall. Ask your provider about this.  When to seek medical advice  Call your healthcare provider right away if any of these occur:  · Cough with lots of colored mucus (sputum) or blood in your mucus  · Chest pain, shortness of breath, wheezing, or trouble breathing  · Severe headache, or face, neck, or ear pain  · New rash with fever  · Fever of 100.4°F (38°C) or higher, or as directed by your healthcare provider  · Confusion, behavior change, or seizure  · Severe weakness or dizziness  · You get a new fever or cough after getting better for a few days  Date Last Reviewed: 1/1/2017  © 1327-3084 [employer]. [address] [place] [address], [place] [address]. All rights reserved. This information is not intended as a substitute for professional medical care.  Always follow your healthcare professional's instructions.

## 2020-08-14 ENCOUNTER — PATIENT OUTREACH (OUTPATIENT)
Dept: ADMINISTRATIVE | Facility: HOSPITAL | Age: 50
End: 2020-08-14

## 2020-08-14 NOTE — PROGRESS NOTES
Health Maintenance Due   Topic Date Due    Hepatitis C Screening  1970    HIV Screening  07/16/1983    TETANUS VACCINE  07/16/1986    Lipid Panel  07/09/2017    Shingles Vaccine (1 of 2) 07/16/2018    Colorectal Cancer Screening  07/16/2018     Chart review completed.

## 2020-08-28 ENCOUNTER — IMMUNIZATION (OUTPATIENT)
Dept: PHARMACY | Facility: CLINIC | Age: 50
End: 2020-08-28

## 2020-08-28 ENCOUNTER — OFFICE VISIT (OUTPATIENT)
Dept: INTERNAL MEDICINE | Facility: CLINIC | Age: 50
End: 2020-08-28
Payer: COMMERCIAL

## 2020-08-28 ENCOUNTER — IMMUNIZATION (OUTPATIENT)
Dept: PHARMACY | Facility: CLINIC | Age: 50
End: 2020-08-28
Payer: COMMERCIAL

## 2020-08-28 ENCOUNTER — LAB VISIT (OUTPATIENT)
Dept: LAB | Facility: HOSPITAL | Age: 50
End: 2020-08-28
Attending: FAMILY MEDICINE
Payer: COMMERCIAL

## 2020-08-28 VITALS
HEIGHT: 67 IN | HEART RATE: 67 BPM | DIASTOLIC BLOOD PRESSURE: 80 MMHG | WEIGHT: 183.63 LBS | BODY MASS INDEX: 28.82 KG/M2 | OXYGEN SATURATION: 98 % | SYSTOLIC BLOOD PRESSURE: 130 MMHG

## 2020-08-28 DIAGNOSIS — Z76.89 ENCOUNTER TO ESTABLISH CARE WITH NEW DOCTOR: Primary | ICD-10-CM

## 2020-08-28 DIAGNOSIS — E78.5 DYSLIPIDEMIA: ICD-10-CM

## 2020-08-28 DIAGNOSIS — Z12.11 COLON CANCER SCREENING: ICD-10-CM

## 2020-08-28 DIAGNOSIS — Z76.89 ENCOUNTER TO ESTABLISH CARE WITH NEW DOCTOR: ICD-10-CM

## 2020-08-28 LAB
25(OH)D3+25(OH)D2 SERPL-MCNC: 36 NG/ML (ref 30–96)
ALBUMIN SERPL BCP-MCNC: 4 G/DL (ref 3.5–5.2)
ALP SERPL-CCNC: 86 U/L (ref 55–135)
ALT SERPL W/O P-5'-P-CCNC: 35 U/L (ref 10–44)
ANION GAP SERPL CALC-SCNC: 9 MMOL/L (ref 8–16)
AST SERPL-CCNC: 23 U/L (ref 10–40)
BASOPHILS # BLD AUTO: 0.04 K/UL (ref 0–0.2)
BASOPHILS NFR BLD: 1 % (ref 0–1.9)
BILIRUB SERPL-MCNC: 0.6 MG/DL (ref 0.1–1)
BUN SERPL-MCNC: 24 MG/DL (ref 6–20)
CALCIUM SERPL-MCNC: 9.4 MG/DL (ref 8.7–10.5)
CHLORIDE SERPL-SCNC: 106 MMOL/L (ref 95–110)
CHOLEST SERPL-MCNC: 247 MG/DL (ref 120–199)
CHOLEST/HDLC SERPL: 4.6 {RATIO} (ref 2–5)
CO2 SERPL-SCNC: 27 MMOL/L (ref 23–29)
COMPLEXED PSA SERPL-MCNC: 0.47 NG/ML (ref 0–4)
CREAT SERPL-MCNC: 1.2 MG/DL (ref 0.5–1.4)
DIFFERENTIAL METHOD: ABNORMAL
EOSINOPHIL # BLD AUTO: 0.1 K/UL (ref 0–0.5)
EOSINOPHIL NFR BLD: 3.3 % (ref 0–8)
ERYTHROCYTE [DISTWIDTH] IN BLOOD BY AUTOMATED COUNT: 13 % (ref 11.5–14.5)
EST. GFR  (AFRICAN AMERICAN): >60 ML/MIN/1.73 M^2
EST. GFR  (NON AFRICAN AMERICAN): >60 ML/MIN/1.73 M^2
ESTIMATED AVG GLUCOSE: 108 MG/DL (ref 68–131)
GLUCOSE SERPL-MCNC: 116 MG/DL (ref 70–110)
HBA1C MFR BLD HPLC: 5.4 % (ref 4–5.6)
HCT VFR BLD AUTO: 49.7 % (ref 40–54)
HDLC SERPL-MCNC: 54 MG/DL (ref 40–75)
HDLC SERPL: 21.9 % (ref 20–50)
HGB BLD-MCNC: 16.1 G/DL (ref 14–18)
IMM GRANULOCYTES # BLD AUTO: 0.02 K/UL (ref 0–0.04)
IMM GRANULOCYTES NFR BLD AUTO: 0.5 % (ref 0–0.5)
LDLC SERPL CALC-MCNC: 167.6 MG/DL (ref 63–159)
LYMPHOCYTES # BLD AUTO: 1.6 K/UL (ref 1–4.8)
LYMPHOCYTES NFR BLD: 40.6 % (ref 18–48)
MCH RBC QN AUTO: 30 PG (ref 27–31)
MCHC RBC AUTO-ENTMCNC: 32.4 G/DL (ref 32–36)
MCV RBC AUTO: 93 FL (ref 82–98)
MONOCYTES # BLD AUTO: 0.4 K/UL (ref 0.3–1)
MONOCYTES NFR BLD: 11 % (ref 4–15)
NEUTROPHILS # BLD AUTO: 1.7 K/UL (ref 1.8–7.7)
NEUTROPHILS NFR BLD: 43.6 % (ref 38–73)
NONHDLC SERPL-MCNC: 193 MG/DL
NRBC BLD-RTO: 0 /100 WBC
PLATELET # BLD AUTO: 188 K/UL (ref 150–350)
PMV BLD AUTO: 11.6 FL (ref 9.2–12.9)
POTASSIUM SERPL-SCNC: 4.5 MMOL/L (ref 3.5–5.1)
PROT SERPL-MCNC: 7 G/DL (ref 6–8.4)
RBC # BLD AUTO: 5.36 M/UL (ref 4.6–6.2)
SODIUM SERPL-SCNC: 142 MMOL/L (ref 136–145)
TRIGL SERPL-MCNC: 127 MG/DL (ref 30–150)
TSH SERPL DL<=0.005 MIU/L-ACNC: 1.64 UIU/ML (ref 0.4–4)
WBC # BLD AUTO: 3.99 K/UL (ref 3.9–12.7)

## 2020-08-28 PROCEDURE — 99396 PR PREVENTIVE VISIT,EST,40-64: ICD-10-PCS | Mod: S$GLB,,, | Performed by: FAMILY MEDICINE

## 2020-08-28 PROCEDURE — 3008F PR BODY MASS INDEX (BMI) DOCUMENTED: ICD-10-PCS | Mod: CPTII,S$GLB,, | Performed by: FAMILY MEDICINE

## 2020-08-28 PROCEDURE — 85025 COMPLETE CBC W/AUTO DIFF WBC: CPT

## 2020-08-28 PROCEDURE — 80053 COMPREHEN METABOLIC PANEL: CPT

## 2020-08-28 PROCEDURE — 83036 HEMOGLOBIN GLYCOSYLATED A1C: CPT

## 2020-08-28 PROCEDURE — 36415 COLL VENOUS BLD VENIPUNCTURE: CPT

## 2020-08-28 PROCEDURE — 99396 PREV VISIT EST AGE 40-64: CPT | Mod: S$GLB,,, | Performed by: FAMILY MEDICINE

## 2020-08-28 PROCEDURE — 84443 ASSAY THYROID STIM HORMONE: CPT

## 2020-08-28 PROCEDURE — 86703 HIV-1/HIV-2 1 RESULT ANTBDY: CPT

## 2020-08-28 PROCEDURE — 82306 VITAMIN D 25 HYDROXY: CPT

## 2020-08-28 PROCEDURE — 84153 ASSAY OF PSA TOTAL: CPT

## 2020-08-28 PROCEDURE — 99999 PR PBB SHADOW E&M-EST. PATIENT-LVL III: ICD-10-PCS | Mod: PBBFAC,,, | Performed by: FAMILY MEDICINE

## 2020-08-28 PROCEDURE — 86803 HEPATITIS C AB TEST: CPT

## 2020-08-28 PROCEDURE — 3008F BODY MASS INDEX DOCD: CPT | Mod: CPTII,S$GLB,, | Performed by: FAMILY MEDICINE

## 2020-08-28 PROCEDURE — 99999 PR PBB SHADOW E&M-EST. PATIENT-LVL III: CPT | Mod: PBBFAC,,, | Performed by: FAMILY MEDICINE

## 2020-08-28 PROCEDURE — 80061 LIPID PANEL: CPT

## 2020-08-28 NOTE — PATIENT INSTRUCTIONS
"Download the "ASCVD risk " jluis on our smartphone. Use this and your current cholesterol and blood pressure numbers to calculate your 10 year cardiovascular risk.     We discussed whole food plant based diets and the good outcomes from recent clinical trails. Recommended the book How Not to Die  by Jose Madrid M.D      Wt Readings from Last 9 Encounters:   08/28/20 83.3 kg (183 lb 10.3 oz)   02/01/20 81.6 kg (180 lb)   08/29/17 79.4 kg (175 lb)   08/14/17 77.1 kg (170 lb)   07/11/17 79.8 kg (176 lb)   09/01/15 70.3 kg (155 lb)   07/09/12 70.3 kg (155 lb)   10/25/11 74.6 kg (164 lb 5.7 oz)       "

## 2020-08-28 NOTE — PROGRESS NOTES
Subjective:       Patient ID: Matt Manley is a 50 y.o. male.  . Has gained weight. Wife/3 kids, one in 2nd year med school. Eats lots of animal products. No Mis/CVAs in family. Usually does tennis for exercise but not much during pandemic    Chief Complaint: No chief complaint on file.    Patient is here to establish care. He notes unexpected weight change.  Wt Readings from Last 9 Encounters:   02/01/20 81.6 kg (180 lb)   08/29/17 79.4 kg (175 lb)   08/14/17 77.1 kg (170 lb)   07/11/17 79.8 kg (176 lb)   09/01/15 70.3 kg (155 lb)   07/09/12 70.3 kg (155 lb)   10/25/11 74.6 kg (164 lb 5.7 oz)     Family History   Problem Relation Age of Onset    Diabetes Father     Prostate cancer Neg Hx     Kidney disease Neg Hx          Social History     Socioeconomic History    Marital status:      Spouse name: Not on file    Number of children: Not on file    Years of education: Not on file    Highest education level: Not on file   Occupational History    Not on file   Social Needs    Financial resource strain: Not hard at all    Food insecurity     Worry: Never true     Inability: Never true    Transportation needs     Medical: No     Non-medical: No   Tobacco Use    Smoking status: Never Smoker    Smokeless tobacco: Never Used   Substance and Sexual Activity    Alcohol use: Yes     Comment: beers    Drug use: No    Sexual activity: Yes     Partners: Female   Lifestyle    Physical activity     Days per week: 0 days     Minutes per session: Not on file    Stress: Not at all   Relationships    Social connections     Talks on phone: Three times a week     Gets together: Twice a week     Attends Zoroastrian service: Not on file     Active member of club or organization: Yes     Attends meetings of clubs or organizations: More than 4 times per year     Relationship status:    Other Topics Concern    Not on file   Social History Narrative    Not on file     Past Surgical History:    Procedure Laterality Date    FOOT SURGERY      [facility]    NASAL SEPTUM SURGERY      VASECTOMY           Patient Active Problem List   Diagnosis    Adjustment disorder with mixed anxiety and depressed mood    Dyslipidemia     Current Outpatient Medications on File Prior to Visit   Medication Sig Dispense Refill    [DISCONTINUED] flu vac [id number] 36mos up,PF, 60 mcg (15 mcg x 4)/0.5 mL Syrg To be administered by the [facility] 0.5 mL 0    [DISCONTINUED] oxycodone-acetaminophen (PERCOCET) 5-325 mg per tablet Take one tablet by mouth every four hours as needed for pain 31 tablet 0    [DISCONTINUED] polyethylene glycol (GLYCOLAX) 17 gram/dose powder Take 17 g by mouth once daily. 119 g 0    [DISCONTINUED] promethazine-codeine 6.25-10 mg/5 ml (PHENERGAN WITH CODEINE) 6.25-10 mg/5 mL syrup Take 5 mLs by mouth every 6 (six) hours as needed for Cough. 60 mL 0     No current facility-administered medications on file prior to visit.          Review of Systems   Constitutional: Positive for unexpected weight change. Negative for activity change.   HENT: Negative for hearing loss, rhinorrhea and trouble swallowing.    Eyes: Negative for discharge and visual disturbance.   Respiratory: Negative for chest tightness and wheezing.    Cardiovascular: Negative for chest pain and palpitations.   Gastrointestinal: Negative for blood in stool, constipation, diarrhea and vomiting.   Endocrine: Negative for polydipsia and polyuria.   Genitourinary: Negative for difficulty urinating, hematuria and urgency.   Musculoskeletal: Negative for arthralgias, joint swelling and neck pain.   Neurological: Negative for weakness and headaches.   Psychiatric/Behavioral: Negative for confusion and dysphoric mood.       Objective:      Physical Exam  Constitutional:       Appearance: He is well-developed.   HENT:      Head: Normocephalic and atraumatic.   Neck:      Musculoskeletal: Neck supple.   Cardiovascular:       Rate and Rhythm: Normal rate.      Heart sounds: Normal heart sounds.   Pulmonary:      Effort: No respiratory distress.      Breath sounds: Normal breath sounds. No wheezing or rales.   Abdominal:      Palpations: Abdomen is soft. There is no mass.      Tenderness: There is no abdominal tenderness. There is no guarding or rebound.      Hernia: No hernia is present.   Skin:     General: Skin is dry.   Neurological:      Mental Status: He is alert.   Psychiatric:         Behavior: Behavior normal.         Assessment:       1. Encounter to establish care with new doctor    2. Dyslipidemia    3. Colon cancer screening        Plan:       Matt Lakhani was seen today for annual exam.    Diagnoses and all orders for this visit:    Encounter to establish care with new doctor  -     CBC auto differential; Future  -     Lipid Panel; Future  -     TSH; Future  -     Hemoglobin A1C; Future  -     Comprehensive metabolic panel; Future  -     PSA, Screening; Future  -     Urinalysis; Future  -     Vitamin D; Future  -     Hepatitis C Antibody; Future  -     HIV 1/2 Ag/Ab (4th Gen); Future  -     Case request GI: COLONOSCOPY    Dyslipidemia  -     Lipid Panel; Future    Colon cancer screening  -     Case request GI: COLONOSCOPY

## 2020-08-31 LAB
HCV AB SERPL QL IA: NEGATIVE
HIV 1+2 AB+HIV1 P24 AG SERPL QL IA: NEGATIVE

## 2020-10-23 ENCOUNTER — TELEPHONE (OUTPATIENT)
Dept: INTERNAL MEDICINE | Facility: CLINIC | Age: 50
End: 2020-10-23

## 2020-10-23 NOTE — TELEPHONE ENCOUNTER
Appointment Request From: Matt Manley     With Provider: Kar Pereira MD [[facility]]     Preferred Date Range: 10/23/2020 - 10/23/2020     Preferred Times: Any Time     Reason for visit: low fever, constipation, problems passing stool, dizzy, headache , nausea     Comments: address symptoms     Patient stated that he thinks he has a bowel obstruction

## 2020-10-23 NOTE — TELEPHONE ENCOUNTER
----- Message from Jennifer Meeks sent at 10/23/2020  7:21 AM CDT -----  Regarding: Patient Portal Request  Appointment Request From: Matt Manley    With Provider: Kar Pereira MD [[facility]]    Preferred Date Range: 10/23/2020 - 10/23/2020    Preferred Times: Any Time    Reason for visit: low fever, constipation, problems passing stool, dizzy, headache , nausea    Comments: address symptoms

## 2020-10-26 ENCOUNTER — PATIENT MESSAGE (OUTPATIENT)
Dept: INTERNAL MEDICINE | Facility: CLINIC | Age: 50
End: 2020-10-26

## 2020-10-26 DIAGNOSIS — Z01.818 PRE-OP TESTING: ICD-10-CM

## 2020-10-26 DIAGNOSIS — Z12.11 SPECIAL SCREENING FOR MALIGNANT NEOPLASMS, COLON: Primary | ICD-10-CM

## 2020-10-26 RX ORDER — POLYETHYLENE GLYCOL 3350, SODIUM SULFATE ANHYDROUS, SODIUM BICARBONATE, SODIUM CHLORIDE, POTASSIUM CHLORIDE 236; 22.74; 6.74; 5.86; 2.97 G/4L; G/4L; G/4L; G/4L; G/4L
4 POWDER, FOR SOLUTION ORAL ONCE
Qty: 4000 ML | Refills: 0 | Status: SHIPPED | OUTPATIENT
Start: 2020-10-26 | End: 2020-10-26

## 2020-11-02 NOTE — TELEPHONE ENCOUNTER
Called the patient and spoke to him. He stated that he is feeling better now. He report no fever, no constipation, no dizziness, no headache, and no nausea. He said he doesn't feel like he needs to go to . He is scheduled for colonoscopy on 11/12. He said he will definitely call if he experience another episode, but for now he's good, and hopefully it stays good until he get his colonoscopy.

## 2020-11-09 ENCOUNTER — LAB VISIT (OUTPATIENT)
Dept: SPORTS MEDICINE | Facility: CLINIC | Age: 50
End: 2020-11-09
Payer: COMMERCIAL

## 2020-11-09 DIAGNOSIS — Z01.818 PRE-OP TESTING: ICD-10-CM

## 2020-11-09 LAB — SARS-COV-2 RNA RESP QL NAA+PROBE: NOT DETECTED

## 2020-11-09 PROCEDURE — U0003 INFECTIOUS AGENT DETECTION BY NUCLEIC ACID (DNA OR RNA); SEVERE ACUTE RESPIRATORY SYNDROME CORONAVIRUS 2 (SARS-COV-2) (CORONAVIRUS DISEASE [COVID-19]), AMPLIFIED PROBE TECHNIQUE, MAKING USE OF HIGH THROUGHPUT TECHNOLOGIES AS DESCRIBED BY CMS-2020-01-R: HCPCS

## 2020-11-12 ENCOUNTER — ANESTHESIA (OUTPATIENT)
Dept: ENDOSCOPY | Facility: HOSPITAL | Age: 50
End: 2020-11-12
Payer: COMMERCIAL

## 2020-11-12 ENCOUNTER — ANESTHESIA EVENT (OUTPATIENT)
Dept: ENDOSCOPY | Facility: HOSPITAL | Age: 50
End: 2020-11-12
Payer: COMMERCIAL

## 2020-11-12 ENCOUNTER — HOSPITAL ENCOUNTER (OUTPATIENT)
Facility: HOSPITAL | Age: 50
Discharge: HOME | End: 2020-11-12
Attending: COLON & RECTAL SURGERY | Admitting: COLON & RECTAL SURGERY
Payer: COMMERCIAL

## 2020-11-12 VITALS
SYSTOLIC BLOOD PRESSURE: 125 MMHG | RESPIRATION RATE: 16 BRPM | DIASTOLIC BLOOD PRESSURE: 82 MMHG | TEMPERATURE: 98 F | BODY MASS INDEX: 28.25 KG/M2 | HEART RATE: 54 BPM | OXYGEN SATURATION: 98 % | HEIGHT: 67 IN | WEIGHT: 180 LBS

## 2020-11-12 DIAGNOSIS — Z12.11 SCREENING FOR COLON CANCER: Primary | ICD-10-CM

## 2020-11-12 PROCEDURE — 25000003 PHARM REV CODE 250: Performed by: NURSE ANESTHETIST, CERTIFIED REGISTERED

## 2020-11-12 PROCEDURE — 37000009 HC ANESTHESIA EA ADD 15 MINS: Performed by: COLON & RECTAL SURGERY

## 2020-11-12 PROCEDURE — G0121 COLON CA SCRN NOT HI RSK IND: HCPCS | Performed by: COLON & RECTAL SURGERY

## 2020-11-12 PROCEDURE — E9220 PRA ENDO ANESTHESIA: ICD-10-PCS | Mod: ,,, | Performed by: NURSE ANESTHETIST, CERTIFIED REGISTERED

## 2020-11-12 PROCEDURE — 37000008 HC ANESTHESIA 1ST 15 MINUTES: Performed by: COLON & RECTAL SURGERY

## 2020-11-12 PROCEDURE — G0121 COLON CA SCRN NOT HI RSK IND: HCPCS | Mod: ,,, | Performed by: COLON & RECTAL SURGERY

## 2020-11-12 PROCEDURE — E9220 PRA ENDO ANESTHESIA: HCPCS | Mod: ,,, | Performed by: NURSE ANESTHETIST, CERTIFIED REGISTERED

## 2020-11-12 PROCEDURE — 63600175 PHARM REV CODE 636 W HCPCS: Performed by: NURSE ANESTHETIST, CERTIFIED REGISTERED

## 2020-11-12 PROCEDURE — G0121 COLON CA SCRN NOT HI RSK IND: ICD-10-PCS | Mod: ,,, | Performed by: COLON & RECTAL SURGERY

## 2020-11-12 RX ORDER — PROPOFOL 10 MG/ML
VIAL (ML) INTRAVENOUS
Status: DISCONTINUED | OUTPATIENT
Start: 2020-11-12 | End: 2020-11-12

## 2020-11-12 RX ORDER — SODIUM CHLORIDE 9 MG/ML
INJECTION, SOLUTION INTRAVENOUS CONTINUOUS PRN
Status: DISCONTINUED | OUTPATIENT
Start: 2020-11-12 | End: 2020-11-12

## 2020-11-12 RX ORDER — PROPOFOL 10 MG/ML
VIAL (ML) INTRAVENOUS CONTINUOUS PRN
Status: DISCONTINUED | OUTPATIENT
Start: 2020-11-12 | End: 2020-11-12

## 2020-11-12 RX ORDER — LIDOCAINE HCL/PF 100 MG/5ML
SYRINGE (ML) INTRAVENOUS
Status: DISCONTINUED | OUTPATIENT
Start: 2020-11-12 | End: 2020-11-12

## 2020-11-12 RX ORDER — SODIUM CHLORIDE 9 MG/ML
INJECTION, SOLUTION INTRAVENOUS CONTINUOUS
Status: DISCONTINUED | OUTPATIENT
Start: 2020-11-12 | End: 2020-11-12 | Stop reason: HOSPADM

## 2020-11-12 RX ADMIN — PROPOFOL 70 MG: 10 INJECTION, EMULSION INTRAVENOUS at 09:11

## 2020-11-12 RX ADMIN — PROPOFOL 30 MG: 10 INJECTION, EMULSION INTRAVENOUS at 09:11

## 2020-11-12 RX ADMIN — Medication 100 MG: at 09:11

## 2020-11-12 RX ADMIN — PROPOFOL 20 MG: 10 INJECTION, EMULSION INTRAVENOUS at 09:11

## 2020-11-12 RX ADMIN — SODIUM CHLORIDE: 0.9 INJECTION, SOLUTION INTRAVENOUS at 09:11

## 2020-11-12 RX ADMIN — PROPOFOL 150 MCG/KG/MIN: 10 INJECTION, EMULSION INTRAVENOUS at 09:11

## 2020-11-12 NOTE — [employer] PATIENT INSTRUCTIONS
Discharge Summary/Instructions after an Endoscopic Procedure  Patient Name: Matt Manley  Patient MRN: [id number]  Patient YOB: 1970 Thursday, November 12, 2020  Alfred Duval MD  RESTRICTIONS:  During your procedure today, you received medications for sedation.  These   medications may affect your judgment, balance and coordination.  Therefore,   for 24 hours, you have the following restrictions:   - DO NOT drive a car, operate machinery, make legal/financial decisions,   sign important papers or drink alcohol.    ACTIVITY:  Today: no heavy lifting, straining or running due to procedural   sedation/anesthesia.  The following day: return to full activity including work.  DIET:  Eat and drink normally unless instructed otherwise.     TREATMENT FOR COMMON SIDE EFFECTS:  - Mild abdominal pain, nausea, belching, bloating or excessive gas:  rest,   eat lightly and use a heating pad.  - Sore Throat: treat with throat lozenges and/or gargle with warm salt   water.  - Because air was used during the procedure, expelling large amounts of air   from your rectum or belching is normal.  - If a bowel prep was taken, you may not have a bowel movement for 1-3 days.    This is normal.  SYMPTOMS TO WATCH FOR AND REPORT TO YOUR PHYSICIAN:  1. Abdominal pain or bloating, other than gas cramps.  2. Chest pain.  3. Back pain.  4. Signs of infection such as: chills or fever occurring within 24 hours   after the procedure.  5. Rectal bleeding, which would show as bright red, maroon, or black stools.   (A tablespoon of blood from the rectum is not serious, especially if   hemorrhoids are present.)  6. Vomiting.  7. Weakness or dizziness.  GO DIRECTLY TO THE NEAREST EMERGENCY ROOM IF YOU HAVE ANY OF THE FOLLOWING:      Difficulty breathing              Chills and/or fever over 101 F   Persistent vomiting and/or vomiting blood   Severe abdominal pain   Severe chest pain   Black, tarry stools   Bleeding- more than one  tablespoon   Any other symptom or condition that you feel may need urgent attention  Your doctor recommends these additional instructions:  If any biopsies were taken, your doctors clinic will contact you in 1 to 2   weeks with any results.  - Discharge patient to home (ambulatory).   - Resume previous diet.   - Continue present medications.   - Repeat colonoscopy in 10 years for screening purposes.  For questions, problems or results please call your physician - Alfred Duval MD at Work:  [phone].  [facility], EMERGENCY ROOM PHONE NUMBER: [phone]  IF A COMPLICATION OR EMERGENCY SITUATION ARISES AND YOU ARE UNABLE TO REACH   YOUR PHYSICIAN - GO DIRECTLY TO THE EMERGENCY ROOM.  Alfred Duval MD  11/12/2020 10:16:22 AM  This report has been verified and signed electronically.  [employer]

## 2020-11-12 NOTE — ANESTHESIA POSTPROCEDURE EVALUATION
Anesthesia Post Evaluation    Patient: Matt Manley    Procedure(s) Performed: Procedure(s) (LRB):  COLONOSCOPY (N/A)    Final Anesthesia Type: general    Patient location during evaluation: GI PACU  Patient participation: Yes- Able to Participate  Level of consciousness: awake and alert  Post-procedure vital signs: reviewed and stable  Pain management: adequate  Airway patency: patent    PONV status at discharge: No PONV  Anesthetic complications: no      Cardiovascular status: stable  Respiratory status: unassisted and spontaneous ventilation  Hydration status: euvolemic  Follow-up not needed.          Vitals Value Taken Time   /82 11/12/20 1048   Temp 36.5 °C (97.7 °F) 11/12/20 1017   Pulse 54 11/12/20 1048   Resp 16 11/12/20 1048   SpO2 98 % 11/12/20 1048         Event Time   Out of Recovery 10:49:52         Pain/Gladys Score: Gladys Score: 10 (11/12/2020 10:30 AM)

## 2020-11-12 NOTE — TRANSFER OF CARE
"Anesthesia Transfer of Care Note    Patient: Matt Manley    Procedure(s) Performed: Procedure(s) (LRB):  COLONOSCOPY (N/A)    Patient location: PACU    Anesthesia Type: general    Transport from OR: Transported from OR on room air with adequate spontaneous ventilation    Post pain: adequate analgesia    Post assessment: no apparent anesthetic complications and tolerated procedure well    Post vital signs: stable    Level of consciousness: sedated and responds to stimulation    Nausea/Vomiting: no nausea/vomiting    Complications: none    Transfer of care protocol was followed      Last vitals:   Visit Vitals  BP (!) 157/90 (BP Location: Left arm, Patient Position: Lying)   Pulse 68   Temp 36.7 °C (98.1 °F) (Oral)   Resp 18   Ht 5' 7" (1.702 m)   Wt 81.6 kg (180 lb)   SpO2 98%   BMI 28.19 kg/m²     "

## 2020-11-12 NOTE — ANESTHESIA PREPROCEDURE EVALUATION
11/12/2020  Matt Manley is a 50 y.o., male.  History reviewed. No pertinent past medical history.  Past Surgical History:   Procedure Laterality Date    FOOT SURGERY      HERNIA REPAIR  2017    lap [facility] with mesh    NASAL SEPTUM SURGERY      VASECTOMY         Anesthesia Evaluation    I have reviewed the Patient Summary Reports.    I have reviewed the Nursing Notes.    I have reviewed the Medications.     Review of Systems  Anesthesia Hx:  No problems with previous Anesthesia  Neg history of prior surgery. Denies Family Hx of Anesthesia complications.   Denies Personal Hx of Anesthesia complications.   Hematology/Oncology:  Hematology Normal   Oncology Normal     EENT/Dental:EENT/Dental Normal   Cardiovascular:   hyperlipidemia    Pulmonary:  Pulmonary Normal    Renal/:  Renal/ Normal     Hepatic/GI:  Hepatic/GI Normal    Musculoskeletal:  Musculoskeletal Normal    Neurological:  Neurology Normal    Endocrine:  Endocrine Normal    Dermatological:  Skin Normal    Psych:   Psychiatric History anxiety depression          Physical Exam  General:  Well nourished    Airway/Jaw/Neck:  Airway Findings: Mouth Opening: Normal Tongue: Normal  General Airway Assessment: Adult  Mallampati: II  Improves to II with phonation.  TM Distance: Normal, at least 6 cm        Eyes/Ears/Nose:  EYES/EARS/NOSE FINDINGS: Normal   Dental:  DENTAL FINDINGS: Normal   Chest/Lungs:  Chest/Lungs Findings: Clear to auscultation, Normal Respiratory Rate     Heart/Vascular:  Heart Findings: Rate: Normal  Rhythm: Regular Rhythm  Sounds: Normal  Heart murmur: negative Vascular Findings: Normal    Abdomen:  Abdomen Findings: Normal    Musculoskeletal:  Musculoskeletal Findings: Normal   Skin:  Skin Findings: Normal    Mental Status:  Mental Status Findings: Normal        Anesthesia Plan  Type of Anesthesia, risks & benefits  discussed:  Anesthesia Type:  general  Patient's Preference: general  Intra-op Monitoring Plan: standard ASA monitors  Intra-op Monitoring Plan Comments:   Post Op Pain Control Plan:   Post Op Pain Control Plan Comments:   Induction:   IV  Beta Blocker:  Patient is not currently on a Beta-Blocker (No further documentation required).       Informed Consent: Patient understands risks and agrees with Anesthesia plan.  Questions answered. Anesthesia consent signed with patient.  ASA Score: 2     Day of Surgery Review of History & Physical: I have interviewed and examined the patient. I have reviewed the patient's H&P dated:    H&P update referred to the provider.         Ready For Surgery From Anesthesia Perspective.

## 2020-11-12 NOTE — H&P
Endoscopy H&P    Procedure : Colonoscopy      asymptomatic screening exam      No past medical history on file.  Sedation Problems: NO  Family History   Problem Relation Age of Onset    Diabetes Father     Prostate cancer Neg Hx     Kidney disease Neg Hx      Fam Hx of Sedation Problems: NO  Social History     Socioeconomic History    Marital status:      Spouse name: Not on file    Number of children: Not on file    Years of education: Not on file    Highest education level: Not on file   Occupational History    Not on file   Social Needs    Financial resource strain: Not hard at all    Food insecurity     Worry: Never true     Inability: Never true    Transportation needs     Medical: No     Non-medical: No   Tobacco Use    Smoking status: Never Smoker    Smokeless tobacco: Never Used   Substance and Sexual Activity    Alcohol use: Yes     Comment: beers    Drug use: No    Sexual activity: Yes     Partners: Female   Lifestyle    Physical activity     Days per week: 0 days     Minutes per session: Not on file    Stress: Not at all   Relationships    Social connections     Talks on phone: Three times a week     Gets together: Twice a week     Attends Scientologist service: Not on file     Active member of club or organization: Yes     Attends meetings of clubs or organizations: More than 4 times per year     Relationship status:    Other Topics Concern    Not on file   Social History Narrative    Not on file       Review of Systems - Negative except HPI    Respiratory ROS: negative  Cardiovascular ROS: negative  Gastrointestinal ROS: negative  Musculoskeletal ROS: negative  Neurological ROS: negative        Physical Exam:  General: no distress  Head: normocephalic  Airway:  normal oropharynx, airway normal  Neck: supple, symmetrical, trachea midline  Lungs:  clear to auscultation bilaterally and normal respiratory  effort  Heart: regular rate and rhythm, S1, S2 normal, no murmur, rub or gallop  Abdomen: soft, non-tender non-distented; bowel sounds normal; no masses,  no organomegaly  Extremities: no cyanosis or edema, or clubbing       Deep Sedation: Mallampati Score per anesthesia     SedationPlan :Propofol     ASA : II

## 2020-12-29 ENCOUNTER — IMMUNIZATION (OUTPATIENT)
Dept: PHARMACY | Facility: CLINIC | Age: 50
End: 2020-12-29
Payer: COMMERCIAL

## 2020-12-29 ENCOUNTER — IMMUNIZATION (OUTPATIENT)
Dept: PHARMACY | Facility: CLINIC | Age: 50
End: 2020-12-29

## 2021-10-04 ENCOUNTER — PATIENT MESSAGE (OUTPATIENT)
Dept: ADMINISTRATIVE | Facility: HOSPITAL | Age: 51
End: 2021-10-04

## 2022-03-16 ENCOUNTER — PATIENT MESSAGE (OUTPATIENT)
Dept: ADMINISTRATIVE | Facility: HOSPITAL | Age: 52
End: 2022-03-16
Payer: COMMERCIAL

## 2022-06-10 ENCOUNTER — PATIENT MESSAGE (OUTPATIENT)
Dept: ADMINISTRATIVE | Facility: HOSPITAL | Age: 52
End: 2022-06-10
Payer: COMMERCIAL

## 2022-06-10 ENCOUNTER — PATIENT OUTREACH (OUTPATIENT)
Dept: ADMINISTRATIVE | Facility: HOSPITAL | Age: 52
End: 2022-06-10
Payer: COMMERCIAL

## 2022-06-10 NOTE — PROGRESS NOTES
Health Maintenance Due   Topic Date Due    COVID-19 Vaccine (4 - Booster for Moderna series) 04/30/2022     Triggered LINKS & reconciled immunizations. Unable to update Care Everywhere. Checked for outside lab results in [facility]; no results found. Portal message sent asking pt to schedule annual visit with Dr. Pereira or update PCP. Chart review completed.

## 2023-08-24 ENCOUNTER — PATIENT MESSAGE (OUTPATIENT)
Dept: INTERNAL MEDICINE | Facility: CLINIC | Age: 53
End: 2023-08-24
Payer: COMMERCIAL

## 2023-08-24 DIAGNOSIS — M21.619 BUNION: Primary | ICD-10-CM

## 2023-08-29 ENCOUNTER — OFFICE VISIT (OUTPATIENT)
Dept: PODIATRY | Facility: CLINIC | Age: 53
End: 2023-08-29
Payer: COMMERCIAL

## 2023-08-29 ENCOUNTER — OFFICE VISIT (OUTPATIENT)
Dept: INTERNAL MEDICINE | Facility: CLINIC | Age: 53
End: 2023-08-29
Payer: COMMERCIAL

## 2023-08-29 ENCOUNTER — LAB VISIT (OUTPATIENT)
Dept: LAB | Facility: HOSPITAL | Age: 53
End: 2023-08-29
Payer: COMMERCIAL

## 2023-08-29 VITALS
BODY MASS INDEX: 29.17 KG/M2 | DIASTOLIC BLOOD PRESSURE: 86 MMHG | HEART RATE: 57 BPM | OXYGEN SATURATION: 99 % | HEIGHT: 67 IN | SYSTOLIC BLOOD PRESSURE: 140 MMHG | WEIGHT: 185.88 LBS

## 2023-08-29 VITALS
DIASTOLIC BLOOD PRESSURE: 87 MMHG | HEIGHT: 67 IN | SYSTOLIC BLOOD PRESSURE: 150 MMHG | BODY MASS INDEX: 29.79 KG/M2 | WEIGHT: 189.81 LBS | HEART RATE: 55 BPM

## 2023-08-29 DIAGNOSIS — R23.3 EASY BRUISING: ICD-10-CM

## 2023-08-29 DIAGNOSIS — Z12.5 SCREENING FOR PROSTATE CANCER: ICD-10-CM

## 2023-08-29 DIAGNOSIS — E78.5 DYSLIPIDEMIA: ICD-10-CM

## 2023-08-29 DIAGNOSIS — R03.0 ELEVATED BLOOD PRESSURE READING: ICD-10-CM

## 2023-08-29 DIAGNOSIS — Z00.00 ANNUAL PHYSICAL EXAM: Primary | ICD-10-CM

## 2023-08-29 DIAGNOSIS — Z00.00 ANNUAL PHYSICAL EXAM: ICD-10-CM

## 2023-08-29 DIAGNOSIS — B07.0 PLANTAR WART, LEFT FOOT: ICD-10-CM

## 2023-08-29 LAB
ALBUMIN SERPL BCP-MCNC: 4.3 G/DL (ref 3.5–5.2)
ALP SERPL-CCNC: 94 U/L (ref 55–135)
ALT SERPL W/O P-5'-P-CCNC: 41 U/L (ref 10–44)
ANION GAP SERPL CALC-SCNC: 9 MMOL/L (ref 8–16)
AST SERPL-CCNC: 25 U/L (ref 10–40)
BASOPHILS # BLD AUTO: 0.06 K/UL (ref 0–0.2)
BASOPHILS NFR BLD: 1.4 % (ref 0–1.9)
BILIRUB SERPL-MCNC: 0.7 MG/DL (ref 0.1–1)
BUN SERPL-MCNC: 18 MG/DL (ref 6–20)
CALCIUM SERPL-MCNC: 9.5 MG/DL (ref 8.7–10.5)
CHLORIDE SERPL-SCNC: 103 MMOL/L (ref 95–110)
CHOLEST SERPL-MCNC: 271 MG/DL (ref 120–199)
CHOLEST/HDLC SERPL: 4.2 {RATIO} (ref 2–5)
CO2 SERPL-SCNC: 26 MMOL/L (ref 23–29)
COMPLEXED PSA SERPL-MCNC: 0.76 NG/ML (ref 0–4)
CREAT SERPL-MCNC: 1.1 MG/DL (ref 0.5–1.4)
DIFFERENTIAL METHOD: NORMAL
EOSINOPHIL # BLD AUTO: 0.1 K/UL (ref 0–0.5)
EOSINOPHIL NFR BLD: 1.7 % (ref 0–8)
ERYTHROCYTE [DISTWIDTH] IN BLOOD BY AUTOMATED COUNT: 12.8 % (ref 11.5–14.5)
EST. GFR  (NO RACE VARIABLE): >60 ML/MIN/1.73 M^2
ESTIMATED AVG GLUCOSE: 111 MG/DL (ref 68–131)
FERRITIN SERPL-MCNC: 319 NG/ML (ref 20–300)
GLUCOSE SERPL-MCNC: 115 MG/DL (ref 70–110)
HBA1C MFR BLD: 5.5 % (ref 4–5.6)
HCT VFR BLD AUTO: 49.8 % (ref 40–54)
HDLC SERPL-MCNC: 64 MG/DL (ref 40–75)
HDLC SERPL: 23.6 % (ref 20–50)
HGB BLD-MCNC: 16.4 G/DL (ref 14–18)
IMM GRANULOCYTES # BLD AUTO: 0 K/UL (ref 0–0.04)
IMM GRANULOCYTES NFR BLD AUTO: 0 % (ref 0–0.5)
IRON SERPL-MCNC: 130 UG/DL (ref 45–160)
LDLC SERPL CALC-MCNC: 182.4 MG/DL (ref 63–159)
LYMPHOCYTES # BLD AUTO: 1.5 K/UL (ref 1–4.8)
LYMPHOCYTES NFR BLD: 37 % (ref 18–48)
MCH RBC QN AUTO: 30.3 PG (ref 27–31)
MCHC RBC AUTO-ENTMCNC: 32.9 G/DL (ref 32–36)
MCV RBC AUTO: 92 FL (ref 82–98)
MONOCYTES # BLD AUTO: 0.4 K/UL (ref 0.3–1)
MONOCYTES NFR BLD: 10.4 % (ref 4–15)
NEUTROPHILS # BLD AUTO: 2.1 K/UL (ref 1.8–7.7)
NEUTROPHILS NFR BLD: 49.5 % (ref 38–73)
NONHDLC SERPL-MCNC: 207 MG/DL
NRBC BLD-RTO: 0 /100 WBC
PLATELET # BLD AUTO: 197 K/UL (ref 150–450)
PMV BLD AUTO: 11.2 FL (ref 9.2–12.9)
POTASSIUM SERPL-SCNC: 4.2 MMOL/L (ref 3.5–5.1)
PROT SERPL-MCNC: 7.1 G/DL (ref 6–8.4)
RBC # BLD AUTO: 5.42 M/UL (ref 4.6–6.2)
SATURATED IRON: 34 % (ref 20–50)
SODIUM SERPL-SCNC: 138 MMOL/L (ref 136–145)
TOTAL IRON BINDING CAPACITY: 382 UG/DL (ref 250–450)
TRANSFERRIN SERPL-MCNC: 258 MG/DL (ref 200–375)
TRIGL SERPL-MCNC: 123 MG/DL (ref 30–150)
TSH SERPL DL<=0.005 MIU/L-ACNC: 1.52 UIU/ML (ref 0.4–4)
WBC # BLD AUTO: 4.14 K/UL (ref 3.9–12.7)

## 2023-08-29 PROCEDURE — 3077F PR MOST RECENT SYSTOLIC BLOOD PRESSURE >= 140 MM HG: ICD-10-PCS | Mod: CPTII,S$GLB,, | Performed by: NURSE PRACTITIONER

## 2023-08-29 PROCEDURE — 3008F BODY MASS INDEX DOCD: CPT | Mod: CPTII,S$GLB,, | Performed by: NURSE PRACTITIONER

## 2023-08-29 PROCEDURE — 36415 COLL VENOUS BLD VENIPUNCTURE: CPT | Performed by: NURSE PRACTITIONER

## 2023-08-29 PROCEDURE — 80053 COMPREHEN METABOLIC PANEL: CPT | Performed by: NURSE PRACTITIONER

## 2023-08-29 PROCEDURE — 3077F PR MOST RECENT SYSTOLIC BLOOD PRESSURE >= 140 MM HG: ICD-10-PCS | Mod: CPTII,S$GLB,, | Performed by: PODIATRIST

## 2023-08-29 PROCEDURE — 84153 ASSAY OF PSA TOTAL: CPT | Performed by: NURSE PRACTITIONER

## 2023-08-29 PROCEDURE — 3077F SYST BP >= 140 MM HG: CPT | Mod: CPTII,S$GLB,, | Performed by: PODIATRIST

## 2023-08-29 PROCEDURE — 82607 VITAMIN B-12: CPT | Performed by: NURSE PRACTITIONER

## 2023-08-29 PROCEDURE — 3079F PR MOST RECENT DIASTOLIC BLOOD PRESSURE 80-89 MM HG: ICD-10-PCS | Mod: CPTII,S$GLB,, | Performed by: PODIATRIST

## 2023-08-29 PROCEDURE — 99999 PR PBB SHADOW E&M-EST. PATIENT-LVL III: ICD-10-PCS | Mod: PBBFAC,,, | Performed by: PODIATRIST

## 2023-08-29 PROCEDURE — 3079F DIAST BP 80-89 MM HG: CPT | Mod: CPTII,S$GLB,, | Performed by: NURSE PRACTITIONER

## 2023-08-29 PROCEDURE — 3079F DIAST BP 80-89 MM HG: CPT | Mod: CPTII,S$GLB,, | Performed by: PODIATRIST

## 2023-08-29 PROCEDURE — 3079F PR MOST RECENT DIASTOLIC BLOOD PRESSURE 80-89 MM HG: ICD-10-PCS | Mod: CPTII,S$GLB,, | Performed by: NURSE PRACTITIONER

## 2023-08-29 PROCEDURE — 3077F SYST BP >= 140 MM HG: CPT | Mod: CPTII,S$GLB,, | Performed by: NURSE PRACTITIONER

## 2023-08-29 PROCEDURE — 3008F PR BODY MASS INDEX (BMI) DOCUMENTED: ICD-10-PCS | Mod: CPTII,S$GLB,, | Performed by: NURSE PRACTITIONER

## 2023-08-29 PROCEDURE — 80061 LIPID PANEL: CPT | Performed by: NURSE PRACTITIONER

## 2023-08-29 PROCEDURE — 1159F PR MEDICATION LIST DOCUMENTED IN MEDICAL RECORD: ICD-10-PCS | Mod: CPTII,S$GLB,, | Performed by: PODIATRIST

## 2023-08-29 PROCEDURE — 99999 PR PBB SHADOW E&M-EST. PATIENT-LVL III: CPT | Mod: PBBFAC,,, | Performed by: PODIATRIST

## 2023-08-29 PROCEDURE — 83036 HEMOGLOBIN GLYCOSYLATED A1C: CPT | Performed by: NURSE PRACTITIONER

## 2023-08-29 PROCEDURE — 99396 PR PREVENTIVE VISIT,EST,40-64: ICD-10-PCS | Mod: S$GLB,,, | Performed by: NURSE PRACTITIONER

## 2023-08-29 PROCEDURE — 82728 ASSAY OF FERRITIN: CPT | Performed by: NURSE PRACTITIONER

## 2023-08-29 PROCEDURE — 84466 ASSAY OF TRANSFERRIN: CPT | Performed by: NURSE PRACTITIONER

## 2023-08-29 PROCEDURE — 99203 OFFICE O/P NEW LOW 30 MIN: CPT | Mod: S$GLB,,, | Performed by: PODIATRIST

## 2023-08-29 PROCEDURE — 3008F PR BODY MASS INDEX (BMI) DOCUMENTED: ICD-10-PCS | Mod: CPTII,S$GLB,, | Performed by: PODIATRIST

## 2023-08-29 PROCEDURE — 99203 PR OFFICE/OUTPT VISIT, NEW, LEVL III, 30-44 MIN: ICD-10-PCS | Mod: S$GLB,,, | Performed by: PODIATRIST

## 2023-08-29 PROCEDURE — 3008F BODY MASS INDEX DOCD: CPT | Mod: CPTII,S$GLB,, | Performed by: PODIATRIST

## 2023-08-29 PROCEDURE — 99396 PREV VISIT EST AGE 40-64: CPT | Mod: S$GLB,,, | Performed by: NURSE PRACTITIONER

## 2023-08-29 PROCEDURE — 1159F MED LIST DOCD IN RCRD: CPT | Mod: CPTII,S$GLB,, | Performed by: PODIATRIST

## 2023-08-29 PROCEDURE — 1160F RVW MEDS BY RX/DR IN RCRD: CPT | Mod: CPTII,S$GLB,, | Performed by: PODIATRIST

## 2023-08-29 PROCEDURE — 99999 PR PBB SHADOW E&M-EST. PATIENT-LVL IV: ICD-10-PCS | Mod: PBBFAC,,, | Performed by: NURSE PRACTITIONER

## 2023-08-29 PROCEDURE — 84443 ASSAY THYROID STIM HORMONE: CPT | Performed by: NURSE PRACTITIONER

## 2023-08-29 PROCEDURE — 99999 PR PBB SHADOW E&M-EST. PATIENT-LVL IV: CPT | Mod: PBBFAC,,, | Performed by: NURSE PRACTITIONER

## 2023-08-29 PROCEDURE — 85025 COMPLETE CBC W/AUTO DIFF WBC: CPT | Performed by: NURSE PRACTITIONER

## 2023-08-29 PROCEDURE — 1160F PR REVIEW ALL MEDS BY PRESCRIBER/CLIN PHARMACIST DOCUMENTED: ICD-10-PCS | Mod: CPTII,S$GLB,, | Performed by: PODIATRIST

## 2023-08-29 NOTE — PROGRESS NOTES
Internal Medicine Annual Exam       CHIEF COMPLAINT     The patient, Matt Manley, who is a 53 y.o. male presents for an annual exam.    HPI     Here for annual - last seen by Dr Pereira 8/28/2020    Corns and plantar warts- followed by podiatry - treated this morning    HM-  Colonoscopy - 11/12/2020 repeat in 10 years (2030)   A1c-8/2020  Tdap-8/28/2020    Pt exercises 2 hours 4-5 times per week - plays tennis       Past Medical History:  Past Medical History:   Diagnosis Date    Anxiety        Past Surgical History:   Procedure Laterality Date    COLONOSCOPY N/A 11/12/2020    Procedure: COLONOSCOPY;  Surgeon: ALBERT Duval MD;  [facility] ([address] [place] [address]);  Service: Endoscopy;  Laterality: N/A;  covid test 11/9-[place]    FOOT SURGERY      HERNIA REPAIR  2017    lap [facility] with mesh    NASAL SEPTUM SURGERY      TONSILLECTOMY  1989    VASECTOMY          Family History   Problem Relation Age of Onset    No Known Problems Mother     Diabetes Father     Tuberculosis Paternal Grandfather     Prostate cancer Neg Hx     Kidney disease Neg Hx         Social History     Socioeconomic History    Marital status:    Tobacco Use    Smoking status: Never    Smokeless tobacco: Never   Substance and Sexual Activity    Alcohol use: Yes     Alcohol/week: 17.0 standard drinks of alcohol     Types: 4 Glasses of wine, 10 Cans of beer, 3 Shots of liquor per week     Comment: 6 beers nightly and ocasionally drinks wine and liquor socailly    Drug use: No    Sexual activity: Yes     Partners: Female   Social History Narrative    Plays tennis 4-5 times per week x 2 hours      Social Determinants of Health     Financial Resource Strain: Low Risk  (8/29/2023)    Overall Financial Resource Strain (CARDIA)     Difficulty of Paying Living Expenses: Not hard at all   Food Insecurity: No Food Insecurity (8/29/2023)    Hunger Vital Sign     Worried About Running Out of Food in the Last Year: Never true     Ran Out of Food in  the Last Year: Never true   Transportation Needs: No Transportation Needs (8/29/2023)    PRAPARE - Transportation     Lack of Transportation (Medical): No     Lack of Transportation (Non-Medical): No   Physical Activity: Sufficiently Active (8/29/2023)    Exercise Vital Sign     Days of Exercise per Week: 6 days     Minutes of Exercise per Session: 120 min   Stress: No Stress Concern Present (8/29/2023)    [employer] - Occupational Stress Questionnaire     Feeling of Stress : Only a little   Social Connections: Unknown (8/29/2023)    Social Connection and Isolation Panel [NHANES]     Frequency of Communication with Friends and Family: More than three times a week     Frequency of Social Gatherings with Friends and Family: More than three times a week     Active Member of Clubs or Organizations: Yes     Attends Club or Organization Meetings: More than 4 times per year     Marital Status:    Housing Stability: Low Risk  (8/29/2023)    Housing Stability Vital Sign     Unable to Pay for Housing in the Last Year: No     Number of Places Lived in the Last Year: 1     Unstable Housing in the Last Year: No        Social History     Tobacco Use   Smoking Status Never   Smokeless Tobacco Never        Allergies as of 08/29/2023 - Reviewed 08/29/2023   Allergen Reaction Noted    No known allergies  09/01/2015          Home Medications:  Prior to Admission medications    Not on File       Review of Systems:  Review of Systems   Constitutional:  Negative for chills, fatigue, fever and unexpected weight change.   HENT:  Negative for congestion, ear pain, hearing loss, postnasal drip and sinus pressure.    Eyes:  Negative for pain, redness and visual disturbance.   Respiratory:  Negative for cough and shortness of breath.    Cardiovascular:  Negative for chest pain and palpitations.   Gastrointestinal:  Negative for abdominal distention and abdominal pain.   Endocrine: Negative for polydipsia,  "polyphagia and polyuria.   Genitourinary:  Negative for dysuria, frequency, hematuria and urgency.   Musculoskeletal:  Negative for arthralgias, gait problem and myalgias.   Skin:  Negative for pallor and rash.   Allergic/Immunologic: Negative for environmental allergies and immunocompromised state.   Neurological:  Negative for dizziness, weakness, light-headedness and headaches.   Hematological:  Negative for adenopathy. Does not bruise/bleed easily.   Psychiatric/Behavioral:  Negative for behavioral problems, confusion and sleep disturbance. The patient is not nervous/anxious.        Health Maintainence:   Immunizations:  Health Maintenance         Date Due Completion Date    COVID-19 Vaccine (4 - Moderna series) 02/24/2022 12/30/2021    Hemoglobin A1c (Diabetic Prevention Screening) 08/28/2023 8/28/2020    Influenza Vaccine (1) 09/01/2023 12/30/2021    Lipid Panel 08/28/2025 8/28/2020    TETANUS VACCINE 08/28/2030 8/28/2020    Colorectal Cancer Screening 11/12/2030 11/12/2020             PHYSICAL EXAM     BP (!) 140/86 (BP Location: Left arm, Patient Position: Sitting, BP Method: Medium (Manual))   Pulse (!) 57   Ht 5' 7" (1.702 m)   Wt 84.3 kg (185 lb 13.6 oz)   SpO2 99%   BMI 29.11 kg/m²  Body mass index is 29.11 kg/m².    Physical Exam  Vitals reviewed.   Constitutional:       Appearance: He is well-developed.   HENT:      Head: Normocephalic.      Right Ear: External ear normal.      Left Ear: External ear normal.      Nose: Nose normal.      Mouth/Throat:      Pharynx: No oropharyngeal exudate.   Eyes:      Pupils: Pupils are equal, round, and reactive to light.   Neck:      Thyroid: No thyromegaly.      Vascular: No JVD.      Trachea: No tracheal deviation.   Cardiovascular:      Rate and Rhythm: Normal rate and regular rhythm.      Heart sounds: No murmur heard.     No friction rub. No gallop.   Pulmonary:      Effort: No respiratory distress.      Breath sounds: Normal breath sounds. No wheezing or " rales.   Chest:      Chest wall: No tenderness.   Abdominal:      General: Bowel sounds are normal. There is no distension.      Palpations: Abdomen is soft.      Tenderness: There is no abdominal tenderness.   Musculoskeletal:         General: No tenderness. Normal range of motion.   Lymphadenopathy:      Cervical: No cervical adenopathy.   Skin:     General: Skin is warm and dry.      Findings: No rash.   Neurological:      Mental Status: He is alert and oriented to person, place, and time.   Psychiatric:         Behavior: Behavior normal.         LABS     Lab Results   Component Value Date    HGBA1C 5.4 08/28/2020     CMP  Sodium   Date Value Ref Range Status   08/28/2020 142 136 - 145 mmol/L Final     Potassium   Date Value Ref Range Status   08/28/2020 4.5 3.5 - 5.1 mmol/L Final     Chloride   Date Value Ref Range Status   08/28/2020 106 95 - 110 mmol/L Final     CO2   Date Value Ref Range Status   08/28/2020 27 23 - 29 mmol/L Final     Glucose   Date Value Ref Range Status   08/28/2020 116 (H) 70 - 110 mg/dL Final     BUN   Date Value Ref Range Status   08/28/2020 24 (H) 6 - 20 mg/dL Final     Creatinine   Date Value Ref Range Status   08/28/2020 1.2 0.5 - 1.4 mg/dL Final     Calcium   Date Value Ref Range Status   08/28/2020 9.4 8.7 - 10.5 mg/dL Final     Total Protein   Date Value Ref Range Status   08/28/2020 7.0 6.0 - 8.4 g/dL Final     Albumin   Date Value Ref Range Status   08/28/2020 4.0 3.5 - 5.2 g/dL Final     Total Bilirubin   Date Value Ref Range Status   08/28/2020 0.6 0.1 - 1.0 mg/dL Final     Comment:     For infants and newborns, interpretation of results should be based  on gestational age, weight and in agreement with clinical  observations.  Premature Infant recommended reference ranges:  Up to 24 hours.............<8.0 mg/dL  Up to 48 hours............<12.0 mg/dL  3-5 days..................<15.0 mg/dL  6-29 days.................<15.0 mg/dL       Alkaline Phosphatase   Date Value Ref Range  Status   08/28/2020 86 55 - 135 U/L Final     AST   Date Value Ref Range Status   08/28/2020 23 10 - 40 U/L Final     ALT   Date Value Ref Range Status   08/28/2020 35 10 - 44 U/L Final     Anion Gap   Date Value Ref Range Status   08/28/2020 9 8 - 16 mmol/L Final     eGFR if    Date Value Ref Range Status   08/28/2020 >60.0 >60 mL/min/1.73 m^2 Final     eGFR if non    Date Value Ref Range Status   08/28/2020 >60.0 >60 mL/min/1.73 m^2 Final     Comment:     Calculation used to obtain the estimated glomerular filtration  rate (eGFR) is the CKD-EPI equation.        Lab Results   Component Value Date    WBC 3.99 08/28/2020    HGB 16.1 08/28/2020    HCT 49.7 08/28/2020    MCV 93 08/28/2020     08/28/2020     Lab Results   Component Value Date    CHOL 247 (H) 08/28/2020    CHOL 228 (H) 07/09/2012    CHOL 205 (H) 10/27/2011     Lab Results   Component Value Date    HDL 54 08/28/2020    HDL 64 07/09/2012    HDL 67 10/27/2011     Lab Results   Component Value Date    LDLCALC 167.6 (H) 08/28/2020    LDLCALC 145.0 07/09/2012    LDLCALC 117.0 10/27/2011     Lab Results   Component Value Date    TRIG 127 08/28/2020    TRIG 96 07/09/2012    TRIG 105 10/27/2011     Lab Results   Component Value Date    CHOLHDL 21.9 08/28/2020    CHOLHDL 28.1 07/09/2012    CHOLHDL 32.7 10/27/2011     Lab Results   Component Value Date    TSH 1.638 08/28/2020       ASSESSMENT/PLAN     Matt Manley is a 53 y.o. male    Annual physical exam- All age and gender related screenings discussed   -     CBC Auto Differential; Future; Expected date: 08/29/2023  -     Comprehensive Metabolic Panel; Future; Expected date: 08/29/2023  -     Hemoglobin A1C; Future; Expected date: 08/29/2023  -     Lipid Panel; Future; Expected date: 08/29/2023  -     TSH; Future; Expected date: 08/29/2023    Dyslipidemia- repeat FLP.   -     Lipid Panel; Future; Expected date: 08/29/2023    Easy bruising- check CBC, iron and B12   -      Vitamin B12 Deficiency Panel; Future; Expected date: 08/29/2023  -     Ferritin; Future; Expected date: 08/29/2023  -     Iron and TIBC; Future; Expected date: 08/29/2023    Screening for prostate cancer  -     PSA, SCREENING; Future; Expected date: 08/29/2023    Elevated blood pressure reading- will decrease etoh intake to 2 drinks per day. Decrease na intake. Cont exercise and healthy diet. Monitor BP at home and bring log f/u in 4 weeks       Follow up with PCP    Addie PEREZ, RUSTY, FNP-c   Department of Internal Medicine - [facility]  11:26 AM

## 2023-08-29 NOTE — PROGRESS NOTES
"Subjective:     Patient ID: Matt Manley is a 53 y.o. male.    Chief Complaint: Heel Pain (Left foot) and Callouses (Left foot)    Matt Lakhani is a 53 y.o. male who presents to the clinic complaining of hard skin on the bottom of the left foot. Has been present for several weeks. Scraped away some of the hard skin with pumice stone and skin shaver. Was not getting better so Matt Lakhani is inquiring about treatment options.    Vitals:    08/29/23 1044   BP: (!) 150/87   Pulse: (!) 55   Weight: 86.1 kg (189 lb 13.1 oz)   Height: 5' 7" (1.702 m)   PainSc:   1   PainLoc: Foot         Review of Systems   Constitutional: Negative for chills and fever.   Cardiovascular:  Negative for chest pain, claudication and leg swelling.   Respiratory:  Negative for cough and shortness of breath.    Skin:  Positive for suspicious lesions. Negative for dry skin and nail changes.   Musculoskeletal: Negative.    Gastrointestinal:  Negative for nausea and vomiting.   Neurological:  Negative for numbness and paresthesias.   Psychiatric/Behavioral:  Negative for altered mental status.         Objective:     Physical Exam  Vitals reviewed.   Constitutional:       Appearance: He is well-developed.   HENT:      Head: Normocephalic.   Cardiovascular:      Pulses:           Dorsalis pedis pulses are 2+ on the right side and 2+ on the left side.        Posterior tibial pulses are 2+ on the right side and 2+ on the left side.      Comments: CRT < 3 sec to tips of toes.    Pulmonary:      Effort: No respiratory distress.   Musculoskeletal:      Comments: Mild pain with palpation and lateral squeeze of HPK lesions L heel, see skin section.    Skin:     General: Skin is warm and dry.      Findings: No erythema.      Comments: Plantar L heel with 2 separate hyperkeratotic lesions with skin lines divergent and dark black capillaries embedded within the dermis. No open wound, bleeding, drainage or SOI.    Neurological:      Mental Status: He " is alert and oriented to person, place, and time.      Sensory: No sensory deficit.      Comments: Light touch, proprioception, and sharp/dull sensation are all intact bilaterally.     Psychiatric:         Behavior: Behavior normal.         Thought Content: Thought content normal.         Judgment: Judgment normal.           Assessment:      Encounter Diagnosis   Name Primary?    Plantar wart, left foot      Plan:     Matt Lakhani was seen today for heel pain and callouses.    Diagnoses and all orders for this visit:    Plantar wart, left foot  -     Ambulatory referral/consult to Podiatry      I counseled the patient on his conditions, their implications and medical management.    Discussed treatment options for plantar wart left heel.  We discussed over-the-counter salicylic acid and freeze spray as well as Cantharone acid application in clinic today.    Patient opted for over-the-counter treatment for now as he is very active and plays tennis and does not want a treatment option that may cause excessive pain/blistering.    Recommended OTC salicylic acid and freeze spray as per kit instructions (recommended Compound-W)    RTC 2-3 weeks, sooner p.r.n.

## 2023-08-30 LAB — VIT B12 SERPL-MCNC: 450 NG/L (ref 180–914)

## 2023-09-05 DIAGNOSIS — E78.5 DYSLIPIDEMIA: Primary | ICD-10-CM

## 2023-09-07 ENCOUNTER — PATIENT MESSAGE (OUTPATIENT)
Dept: INTERNAL MEDICINE | Facility: CLINIC | Age: 53
End: 2023-09-07
Payer: COMMERCIAL

## 2023-09-07 RX ORDER — ROSUVASTATIN CALCIUM 5 MG/1
5 TABLET, COATED ORAL DAILY
Qty: 90 TABLET | Refills: 1 | Status: SHIPPED | OUTPATIENT
Start: 2023-09-07 | End: 2024-09-06

## 2023-09-26 ENCOUNTER — OFFICE VISIT (OUTPATIENT)
Dept: INTERNAL MEDICINE | Facility: CLINIC | Age: 53
End: 2023-09-26
Payer: COMMERCIAL

## 2023-09-26 VITALS
HEIGHT: 67 IN | DIASTOLIC BLOOD PRESSURE: 84 MMHG | OXYGEN SATURATION: 98 % | SYSTOLIC BLOOD PRESSURE: 130 MMHG | BODY MASS INDEX: 29.31 KG/M2 | WEIGHT: 186.75 LBS | HEART RATE: 54 BPM

## 2023-09-26 DIAGNOSIS — I10 HYPERTENSION, UNSPECIFIED TYPE: Primary | ICD-10-CM

## 2023-09-26 DIAGNOSIS — E78.5 DYSLIPIDEMIA: ICD-10-CM

## 2023-09-26 PROCEDURE — 99214 PR OFFICE/OUTPT VISIT, EST, LEVL IV, 30-39 MIN: ICD-10-PCS | Mod: S$GLB,,, | Performed by: NURSE PRACTITIONER

## 2023-09-26 PROCEDURE — 99999 PR PBB SHADOW E&M-EST. PATIENT-LVL III: CPT | Mod: PBBFAC,,, | Performed by: NURSE PRACTITIONER

## 2023-09-26 PROCEDURE — 99214 OFFICE O/P EST MOD 30 MIN: CPT | Mod: S$GLB,,, | Performed by: NURSE PRACTITIONER

## 2023-09-26 PROCEDURE — 1159F PR MEDICATION LIST DOCUMENTED IN MEDICAL RECORD: ICD-10-PCS | Mod: CPTII,S$GLB,, | Performed by: NURSE PRACTITIONER

## 2023-09-26 PROCEDURE — 3079F DIAST BP 80-89 MM HG: CPT | Mod: CPTII,S$GLB,, | Performed by: NURSE PRACTITIONER

## 2023-09-26 PROCEDURE — 99999 PR PBB SHADOW E&M-EST. PATIENT-LVL III: ICD-10-PCS | Mod: PBBFAC,,, | Performed by: NURSE PRACTITIONER

## 2023-09-26 PROCEDURE — 3044F PR MOST RECENT HEMOGLOBIN A1C LEVEL <7.0%: ICD-10-PCS | Mod: CPTII,S$GLB,, | Performed by: NURSE PRACTITIONER

## 2023-09-26 PROCEDURE — 3075F SYST BP GE 130 - 139MM HG: CPT | Mod: CPTII,S$GLB,, | Performed by: NURSE PRACTITIONER

## 2023-09-26 PROCEDURE — 3008F BODY MASS INDEX DOCD: CPT | Mod: CPTII,S$GLB,, | Performed by: NURSE PRACTITIONER

## 2023-09-26 PROCEDURE — 3044F HG A1C LEVEL LT 7.0%: CPT | Mod: CPTII,S$GLB,, | Performed by: NURSE PRACTITIONER

## 2023-09-26 PROCEDURE — 3075F PR MOST RECENT SYSTOLIC BLOOD PRESS GE 130-139MM HG: ICD-10-PCS | Mod: CPTII,S$GLB,, | Performed by: NURSE PRACTITIONER

## 2023-09-26 PROCEDURE — 3008F PR BODY MASS INDEX (BMI) DOCUMENTED: ICD-10-PCS | Mod: CPTII,S$GLB,, | Performed by: NURSE PRACTITIONER

## 2023-09-26 PROCEDURE — 1159F MED LIST DOCD IN RCRD: CPT | Mod: CPTII,S$GLB,, | Performed by: NURSE PRACTITIONER

## 2023-09-26 PROCEDURE — 3079F PR MOST RECENT DIASTOLIC BLOOD PRESSURE 80-89 MM HG: ICD-10-PCS | Mod: CPTII,S$GLB,, | Performed by: NURSE PRACTITIONER

## 2023-09-26 RX ORDER — AMLODIPINE BESYLATE 2.5 MG/1
2.5 TABLET ORAL DAILY
Qty: 30 TABLET | Refills: 11 | Status: SHIPPED | OUTPATIENT
Start: 2023-09-26 | End: 2024-09-25

## 2023-09-26 NOTE — PROGRESS NOTES
"INTERNAL MEDICINE PROGRESS NOTE    CHIEF COMPLAINT     Chief Complaint   Patient presents with    Follow-up     BP CK        HPI     Matt Manley is a 53 y.o. male who presents for a follow up visit today.    Here BP check   Last seen 8/29/2023 for annual   "Elevated blood pressure reading- will decrease etoh intake to 2 drinks per day. Decrease na intake. Cont exercise and healthy diet. Monitor BP at home and bring log f/u in 4 weeks"             Past Medical History:  Past Medical History:   Diagnosis Date    Anxiety        Home Medications:  Prior to Admission medications    Medication Sig Start Date End Date Taking? Authorizing Provider   rosuvastatin (CRESTOR) 5 MG tablet Take 1 tablet (5 mg total) by mouth once daily. 9/7/23 9/6/24  Kristi Mckeon MD       Review of Systems:  Review of Systems   Constitutional:  Negative for chills and fever.   HENT:  Negative for congestion, rhinorrhea, sinus pressure and sore throat.    Eyes:  Negative for visual disturbance.   Respiratory:  Negative for cough and shortness of breath.    Cardiovascular:  Negative for chest pain, palpitations and leg swelling.   Gastrointestinal:  Negative for abdominal pain, constipation, diarrhea, nausea and vomiting.   Genitourinary:  Negative for dysuria, frequency and urgency.   Musculoskeletal:  Negative for arthralgias and myalgias.   Neurological:  Negative for dizziness, light-headedness and headaches.       Health Maintainence:   Immunizations:  Health Maintenance         Date Due Completion Date    COVID-19 Vaccine (4 - Moderna series) 02/24/2022 12/30/2021    Influenza Vaccine (1) 09/01/2023 12/30/2021    Hemoglobin A1c (Diabetic Prevention Screening) 08/29/2026 8/29/2023    Lipid Panel 08/29/2028 8/29/2023    TETANUS VACCINE 08/28/2030 8/28/2020    Colorectal Cancer Screening 11/12/2030 11/12/2020             PHYSICAL EXAM     /84 (BP Location: Right arm, Patient Position: Sitting, BP Method: Medium (Manual))   " "Pulse (!) 54   Ht 5' 7" (1.702 m)   Wt 84.7 kg (186 lb 11.7 oz)   SpO2 98%   BMI 29.25 kg/m²     Physical Exam  Vitals reviewed.   Constitutional:       Appearance: He is well-developed.   HENT:      Head: Normocephalic and atraumatic.   Eyes:      Pupils: Pupils are equal, round, and reactive to light.   Cardiovascular:      Rate and Rhythm: Normal rate and regular rhythm.   Pulmonary:      Effort: Pulmonary effort is normal.   Neurological:      Mental Status: He is alert and oriented to person, place, and time.         LABS     Lab Results   Component Value Date    HGBA1C 5.5 08/29/2023     CMP  Sodium   Date Value Ref Range Status   08/29/2023 138 136 - 145 mmol/L Final     Potassium   Date Value Ref Range Status   08/29/2023 4.2 3.5 - 5.1 mmol/L Final     Chloride   Date Value Ref Range Status   08/29/2023 103 95 - 110 mmol/L Final     CO2   Date Value Ref Range Status   08/29/2023 26 23 - 29 mmol/L Final     Glucose   Date Value Ref Range Status   08/29/2023 115 (H) 70 - 110 mg/dL Final     BUN   Date Value Ref Range Status   08/29/2023 18 6 - 20 mg/dL Final     Creatinine   Date Value Ref Range Status   08/29/2023 1.1 0.5 - 1.4 mg/dL Final     Calcium   Date Value Ref Range Status   08/29/2023 9.5 8.7 - 10.5 mg/dL Final     Total Protein   Date Value Ref Range Status   08/29/2023 7.1 6.0 - 8.4 g/dL Final     Albumin   Date Value Ref Range Status   08/29/2023 4.3 3.5 - 5.2 g/dL Final     Total Bilirubin   Date Value Ref Range Status   08/29/2023 0.7 0.1 - 1.0 mg/dL Final     Comment:     For infants and newborns, interpretation of results should be based  on gestational age, weight and in agreement with clinical  observations.    Premature Infant recommended reference ranges:  Up to 24 hours.............<8.0 mg/dL  Up to 48 hours............<12.0 mg/dL  3-5 days..................<15.0 mg/dL  6-29 days.................<15.0 mg/dL       Alkaline Phosphatase   Date Value Ref Range Status   08/29/2023 94 55 - " 135 U/L Final     AST   Date Value Ref Range Status   08/29/2023 25 10 - 40 U/L Final     ALT   Date Value Ref Range Status   08/29/2023 41 10 - 44 U/L Final     Anion Gap   Date Value Ref Range Status   08/29/2023 9 8 - 16 mmol/L Final     eGFR if    Date Value Ref Range Status   08/28/2020 >60.0 >60 mL/min/1.73 m^2 Final     eGFR if non    Date Value Ref Range Status   08/28/2020 >60.0 >60 mL/min/1.73 m^2 Final     Comment:     Calculation used to obtain the estimated glomerular filtration  rate (eGFR) is the CKD-EPI equation.        Lab Results   Component Value Date    WBC 4.14 08/29/2023    HGB 16.4 08/29/2023    HCT 49.8 08/29/2023    MCV 92 08/29/2023     08/29/2023     Lab Results   Component Value Date    CHOL 271 (H) 08/29/2023    CHOL 247 (H) 08/28/2020    CHOL 228 (H) 07/09/2012     Lab Results   Component Value Date    HDL 64 08/29/2023    HDL 54 08/28/2020    HDL 64 07/09/2012     Lab Results   Component Value Date    LDLCALC 182.4 (H) 08/29/2023    LDLCALC 167.6 (H) 08/28/2020    LDLCALC 145.0 07/09/2012     Lab Results   Component Value Date    TRIG 123 08/29/2023    TRIG 127 08/28/2020    TRIG 96 07/09/2012     Lab Results   Component Value Date    CHOLHDL 23.6 08/29/2023    CHOLHDL 21.9 08/28/2020    CHOLHDL 28.1 07/09/2012     Lab Results   Component Value Date    TSH 1.520 08/29/2023       ASSESSMENT/PLAN     Matt Manley is a 53 y.o. male     Hypertension, unspecified type- start amlodipine and monitor BP daily. Will send log in 4 weeks. Continue lifestyle changes.   -     amLODIPine (NORVASC) 2.5 MG tablet; Take 1 tablet (2.5 mg total) by mouth once daily.  Dispense: 30 tablet; Refill: 11    Dyslipidemia- stable. Taking crestor no issues.    Follow up with PCP in 3 months     Patient education provided from [place]. Patient was counseled on when and how to seek emergent care.       Addie PEREZ, APRN, FNP-c   Department of Internal  Medicine - [facility]  10:42 AM

## 2023-10-08 ENCOUNTER — OFFICE VISIT (OUTPATIENT)
Dept: URGENT CARE | Facility: CLINIC | Age: 53
End: 2023-10-08
Payer: COMMERCIAL

## 2023-10-08 VITALS
HEART RATE: 61 BPM | OXYGEN SATURATION: 98 % | DIASTOLIC BLOOD PRESSURE: 84 MMHG | WEIGHT: 186 LBS | HEIGHT: 67 IN | SYSTOLIC BLOOD PRESSURE: 146 MMHG | BODY MASS INDEX: 29.19 KG/M2 | TEMPERATURE: 98 F

## 2023-10-08 DIAGNOSIS — S61.002A AVULSION OF SKIN OF LEFT THUMB, INITIAL ENCOUNTER: Primary | ICD-10-CM

## 2023-10-08 PROCEDURE — 99203 OFFICE O/P NEW LOW 30 MIN: CPT | Mod: S$GLB,,, | Performed by: NURSE PRACTITIONER

## 2023-10-08 PROCEDURE — 99203 PR OFFICE/OUTPT VISIT, NEW, LEVL III, 30-44 MIN: ICD-10-PCS | Mod: S$GLB,,, | Performed by: NURSE PRACTITIONER

## 2023-10-08 RX ORDER — SILVER NITRATE 38.21; 12.74 MG/1; MG/1
1 STICK TOPICAL ONCE
Status: COMPLETED | OUTPATIENT
Start: 2023-10-08 | End: 2023-10-08

## 2023-10-08 RX ORDER — MUPIROCIN 20 MG/G
OINTMENT TOPICAL ONCE
Status: COMPLETED | OUTPATIENT
Start: 2023-10-08 | End: 2023-10-08

## 2023-10-08 RX ADMIN — SILVER NITRATE 1 APPLICATOR: 38.21; 12.74 STICK TOPICAL at 05:10

## 2023-10-08 RX ADMIN — MUPIROCIN: 20 OINTMENT TOPICAL at 05:10

## 2023-10-08 NOTE — PROGRESS NOTES
"Subjective:      Patient ID: Matt Manley is a 53 y.o. male.    Vitals:  height is 5' 7" (1.702 m) and weight is 84.4 kg (186 lb). His temperature is 98.2 °F (36.8 °C). His blood pressure is 146/84 (abnormal) and his pulse is 61. His oxygen saturation is 98%.     Chief Complaint: Laceration    This is a 53 y.o. male who presents today with a chief complaint of left thumb injury. Pt cut his left hand with a knife while chopping onions. Pt wrapped his hand at home- states it was gushing blood. Had to wrap twice so far due to blood loss. (Reports did not go through multiple pieces of gauze or cloths). Tetanus shot up to date. Pt right-hand dominant.     Trauma  The incident occurred 6 to 12 hours ago. The incident occurred at home. The injury mechanism was a direct blow. The injury occurred in the context of other (cut while cooking). No protective equipment was used. Arm injury location: to left thumb. The pain is mild. It is certain (knife) that a foreign body is present. Associated symptoms include numbness and tingling. There have been no prior injuries to these areas. His tetanus status is unknown.       Skin:  Positive for laceration.   Neurological:  Positive for numbness.      Objective:     Physical Exam   Constitutional: He is oriented to person, place, and time.  Non-toxic appearance. He does not appear ill. No distress.   HENT:   Nose: Nose normal.   Mouth/Throat: Mucous membranes are moist.   Cardiovascular: Normal rate.   Pulmonary/Chest: Effort normal.   Abdominal: Normal appearance.   Musculoskeletal: Normal range of motion.         General: Normal range of motion.   Neurological: He is alert and oriented to person, place, and time.   Skin: Skin is warm and not diaphoretic.         Comments: The thumb is presently wrapped with gauze and koban. Unwrapped in room. There is a small skin avulsion (0.5 cm) to left distal thumb tip. There is scant bleeding to the site. Pt has full ROM. There is no nail " involvement and no FB to site. Avulsion is unable to be sutured as there is no true laceration or enough skin to pull wound closed.    Psychiatric: His behavior is normal. Mood normal.   Nursing note and vitals reviewed.      Assessment:     1. Avulsion of skin of left thumb, initial encounter        Plan:   Silver nitrate stick used to control the bleed, in which bleeding was immediately controlled.. wound then wrapped with gauze and koban. Pt instructed to leave bandage in place until tomorrow (unless he feels it is too tight, then ok to remove dressing to assess site).     Avulsion of skin of left thumb, initial encounter  Comments:  at distal tip of thumb  Orders:  -     silver nitrate applicators applicator 1 applicator  -     mupirocin 2 % ointment    --use antibiotic ointment after 2 days, once silver nitrate has dried up your wound.     Patient Instructions   Monitor for signs of infection: increased redness, swelling, pus-like drainage, red-streaking up the hand/arm, or fever  With any of this, or any other concerns, please follow up asap.     Return with any persisting bleeding   Rest the hand  Ice packs as needed for pain, or bleeding   Elevate the hand when at rest.

## 2023-10-08 NOTE — PATIENT INSTRUCTIONS
Monitor for signs of infection: increased redness, swelling, pus-like drainage, red-streaking up the hand/arm, or fever  With any of this, or any other concerns, please follow up asap.     Return with any persisting bleeding   Rest the hand  Ice packs as needed for pain, or bleeding   Elevate the hand when at rest.

## 2023-11-15 ENCOUNTER — IMMUNIZATION (OUTPATIENT)
Dept: INTERNAL MEDICINE | Facility: CLINIC | Age: 53
End: 2023-11-15
Payer: COMMERCIAL

## 2023-11-15 DIAGNOSIS — Z23 NEED FOR VACCINATION: Primary | ICD-10-CM

## 2023-11-15 PROCEDURE — 90471 FLU VACCINE (QUAD) GREATER THAN OR EQUAL TO 3YO PRESERVATIVE FREE IM: ICD-10-PCS | Mod: S$GLB,,, | Performed by: INTERNAL MEDICINE

## 2023-11-15 PROCEDURE — 90471 IMMUNIZATION ADMIN: CPT | Mod: S$GLB,,, | Performed by: INTERNAL MEDICINE

## 2023-11-15 PROCEDURE — 90686 IIV4 VACC NO PRSV 0.5 ML IM: CPT | Mod: S$GLB,,, | Performed by: INTERNAL MEDICINE

## 2023-11-15 PROCEDURE — 90686 FLU VACCINE (QUAD) GREATER THAN OR EQUAL TO 3YO PRESERVATIVE FREE IM: ICD-10-PCS | Mod: S$GLB,,, | Performed by: INTERNAL MEDICINE

## 2024-01-08 ENCOUNTER — LAB VISIT (OUTPATIENT)
Dept: LAB | Facility: HOSPITAL | Age: 54
End: 2024-01-08
Payer: COMMERCIAL

## 2024-01-08 DIAGNOSIS — E78.5 DYSLIPIDEMIA: ICD-10-CM

## 2024-01-08 LAB
ALBUMIN SERPL BCP-MCNC: 4.1 G/DL (ref 3.5–5.2)
ALP SERPL-CCNC: 98 U/L (ref 55–135)
ALT SERPL W/O P-5'-P-CCNC: 49 U/L (ref 10–44)
AST SERPL-CCNC: 29 U/L (ref 10–40)
BILIRUB DIRECT SERPL-MCNC: 0.4 MG/DL (ref 0.1–0.3)
BILIRUB SERPL-MCNC: 1.2 MG/DL (ref 0.1–1)
CHOLEST SERPL-MCNC: 219 MG/DL (ref 120–199)
CHOLEST/HDLC SERPL: 3.5 {RATIO} (ref 2–5)
HDLC SERPL-MCNC: 63 MG/DL (ref 40–75)
HDLC SERPL: 28.8 % (ref 20–50)
LDLC SERPL CALC-MCNC: 125.6 MG/DL (ref 63–159)
NONHDLC SERPL-MCNC: 156 MG/DL
PROT SERPL-MCNC: 6.9 G/DL (ref 6–8.4)
TRIGL SERPL-MCNC: 152 MG/DL (ref 30–150)

## 2024-01-08 PROCEDURE — 80061 LIPID PANEL: CPT | Performed by: NURSE PRACTITIONER

## 2024-01-08 PROCEDURE — 80076 HEPATIC FUNCTION PANEL: CPT | Performed by: NURSE PRACTITIONER

## 2024-01-08 PROCEDURE — 36415 COLL VENOUS BLD VENIPUNCTURE: CPT | Performed by: NURSE PRACTITIONER

## 2024-01-11 DIAGNOSIS — R74.01 ELEVATED ALT MEASUREMENT: Primary | ICD-10-CM

## 2024-01-29 ENCOUNTER — PATIENT MESSAGE (OUTPATIENT)
Dept: INTERNAL MEDICINE | Facility: CLINIC | Age: 54
End: 2024-01-29
Payer: COMMERCIAL

## 2024-01-29 DIAGNOSIS — Z20.5 EXPOSURE TO HEPATITIS C: Primary | ICD-10-CM

## 2024-01-30 ENCOUNTER — LAB VISIT (OUTPATIENT)
Dept: LAB | Facility: HOSPITAL | Age: 54
End: 2024-01-30
Payer: COMMERCIAL

## 2024-01-30 DIAGNOSIS — R74.01 ELEVATED ALT MEASUREMENT: ICD-10-CM

## 2024-01-30 DIAGNOSIS — Z20.5 EXPOSURE TO HEPATITIS C: ICD-10-CM

## 2024-01-30 LAB
ALBUMIN SERPL BCP-MCNC: 4.1 G/DL (ref 3.5–5.2)
ALP SERPL-CCNC: 93 U/L (ref 55–135)
ALT SERPL W/O P-5'-P-CCNC: 45 U/L (ref 10–44)
AST SERPL-CCNC: 28 U/L (ref 10–40)
BILIRUB DIRECT SERPL-MCNC: 0.3 MG/DL (ref 0.1–0.3)
BILIRUB SERPL-MCNC: 0.8 MG/DL (ref 0.1–1)
PROT SERPL-MCNC: 6.8 G/DL (ref 6–8.4)

## 2024-01-30 PROCEDURE — 86803 HEPATITIS C AB TEST: CPT | Performed by: NURSE PRACTITIONER

## 2024-01-30 PROCEDURE — 36415 COLL VENOUS BLD VENIPUNCTURE: CPT | Performed by: NURSE PRACTITIONER

## 2024-01-30 PROCEDURE — 80076 HEPATIC FUNCTION PANEL: CPT | Performed by: NURSE PRACTITIONER

## 2024-01-31 LAB — HCV AB SERPL QL IA: NORMAL

## 2024-03-21 ENCOUNTER — OFFICE VISIT (OUTPATIENT)
Dept: URGENT CARE | Facility: CLINIC | Age: 54
End: 2024-03-21
Payer: COMMERCIAL

## 2024-03-21 VITALS
DIASTOLIC BLOOD PRESSURE: 99 MMHG | RESPIRATION RATE: 16 BRPM | SYSTOLIC BLOOD PRESSURE: 153 MMHG | BODY MASS INDEX: 29.13 KG/M2 | WEIGHT: 186 LBS | TEMPERATURE: 98 F | HEART RATE: 75 BPM | OXYGEN SATURATION: 99 %

## 2024-03-21 DIAGNOSIS — I10 ELEVATED BLOOD PRESSURE READING IN OFFICE WITH DIAGNOSIS OF HYPERTENSION: ICD-10-CM

## 2024-03-21 DIAGNOSIS — J06.9 VIRAL URI: Primary | ICD-10-CM

## 2024-03-21 DIAGNOSIS — J02.9 SORE THROAT: ICD-10-CM

## 2024-03-21 LAB
CTP QC/QA: YES
SARS-COV-2 AG RESP QL IA.RAPID: NEGATIVE

## 2024-03-21 PROCEDURE — 99213 OFFICE O/P EST LOW 20 MIN: CPT | Mod: S$GLB,,, | Performed by: PHYSICIAN ASSISTANT

## 2024-03-21 PROCEDURE — 87811 SARS-COV-2 COVID19 W/OPTIC: CPT | Mod: QW,S$GLB,, | Performed by: PHYSICIAN ASSISTANT

## 2024-03-21 NOTE — PROGRESS NOTES
Subjective:      Patient ID: Matt Manley is a 53 y.o. male.    Vitals:  weight is 84.4 kg (186 lb). His oral temperature is 98.1 °F (36.7 °C). His blood pressure is 153/99 (abnormal) and his pulse is 75. His respiration is 16 and oxygen saturation is 99%.     Chief Complaint: Sore Throat    This is a 53 y.o. male who presents today with a chief complaint of mild sore throat that began this morning.  Associated cough and nasal congestion. No ear px, ear congestion, nausea, vomiting, diarrhea, abd px, fever or headache.  Patient would like to get COVID test.  Home tx: none      Cough  This is a new problem. The current episode started today. The problem has been unchanged. The cough is Productive of sputum. Associated symptoms include nasal congestion and a sore throat. Pertinent negatives include no chest pain, chills, ear congestion, ear pain, eye redness, fever, headaches, myalgias, postnasal drip, rash, rhinorrhea or shortness of breath. His past medical history is significant for bronchitis and pneumonia. There is no history of asthma or environmental allergies.   Sore Throat   This is a new problem. The current episode started today. The problem has been unchanged. Neither side of throat is experiencing more pain than the other. The pain is mild. Associated symptoms include congestion and coughing. Pertinent negatives include no abdominal pain, diarrhea, ear pain, headaches, neck pain, shortness of breath or vomiting.       Constitution: Negative for chills, sweating, fatigue and fever.   HENT:  Positive for congestion and sore throat. Negative for ear pain and postnasal drip.    Neck: Negative for neck pain and neck stiffness.   Cardiovascular:  Negative for chest pain, leg swelling, palpitations and sob on exertion.   Eyes:  Negative for eye itching, eye pain and eye redness.   Respiratory:  Positive for cough and sputum production. Negative for shortness of breath.    Gastrointestinal:  Negative for  abdominal pain, nausea, vomiting and diarrhea.   Genitourinary:  Negative for dysuria, frequency, urgency, flank pain and hematuria.   Musculoskeletal:  Negative for pain, joint pain and muscle ache.   Skin:  Negative for color change and rash.   Allergic/Immunologic: Negative for environmental allergies.   Neurological:  Negative for dizziness, passing out, headaches, disorientation and numbness.   Psychiatric/Behavioral:  Negative for disorientation.       Objective:     Physical Exam   Constitutional: He is oriented to person, place, and time. He appears well-developed. He is cooperative.  Non-toxic appearance. He does not appear ill. No distress.   HENT:   Head: Normocephalic and atraumatic.   Ears:   Right Ear: Hearing, tympanic membrane, external ear and ear canal normal.   Left Ear: Hearing, tympanic membrane, external ear and ear canal normal.   Nose: Nose normal. No mucosal edema, rhinorrhea or nasal deformity. No epistaxis. Right sinus exhibits no maxillary sinus tenderness and no frontal sinus tenderness. Left sinus exhibits no maxillary sinus tenderness and no frontal sinus tenderness.   Mouth/Throat: Uvula is midline, oropharynx is clear and moist and mucous membranes are normal. No trismus in the jaw. Normal dentition. No uvula swelling. No oropharyngeal exudate, posterior oropharyngeal edema, posterior oropharyngeal erythema, tonsillar abscesses or cobblestoning. Tonsils are 0 on the right. Tonsils are 0 on the left. No tonsillar exudate.   Eyes: Conjunctivae and lids are normal. No scleral icterus.   Neck: Trachea normal and phonation normal. Neck supple. No edema present. No erythema present. No neck rigidity present.   Cardiovascular: Normal rate, regular rhythm, normal heart sounds and normal pulses.   Pulmonary/Chest: Effort normal and breath sounds normal. No accessory muscle usage or stridor. No tachypnea and no bradypnea. No respiratory distress. He has no decreased breath sounds. He has no  wheezes. He has no rhonchi. He has no rales.   Abdominal: Normal appearance.   Musculoskeletal: Normal range of motion.         General: No deformity. Normal range of motion.   Lymphadenopathy:        Head (right side): No submandibular adenopathy present.        Head (left side): No submandibular adenopathy present.     He has no cervical adenopathy.   Neurological: He is alert and oriented to person, place, and time. He exhibits normal muscle tone. Coordination normal.   Skin: Skin is warm, dry, intact, not diaphoretic and not pale.   Psychiatric: His speech is normal and behavior is normal. Judgment and thought content normal.   Nursing note and vitals reviewed.    Results for orders placed or performed in visit on 03/21/24   SARS Coronavirus 2 Antigen, POCT Manual Read   Result Value Ref Range    SARS Coronavirus 2 Antigen Negative Negative     Acceptable Yes        Assessment:     1. Viral URI    2. Sore throat    3. Elevated blood pressure reading in office with diagnosis of hypertension        Plan:       Viral URI  -     SARS Coronavirus 2 Antigen, POCT Manual Read    Sore throat    Elevated blood pressure reading in office with diagnosis of hypertension      - Discussed likely viral etiology, home care, tx options, and given follow up precautions.  Patient declined Rx, given OTC recs.  I have reviewed the patient's chart to view previous visits, labs, and imaging to assess PMH and look for any trends or previous treatments.

## 2024-03-21 NOTE — PATIENT INSTRUCTIONS
- Rest.    - Drink plenty of fluids.  - Viral upper respiratory infections typically run their course in 10-14 days.     - Tylenol (acetaminophen) or Ibuprofen as directed as needed for fever/pain. Avoid tylenol if you have a history of liver disease. Do not take ibuprofen if you have a history of GI bleeding, kidney disease, gastric surgery, or if you take blood thinners.     - You can take over-the-counter claritin, zyrtec, allegra, or xyzal as directed. These are antihistamines that can help with runny nose, nasal congestion, sneezing, and helps to dry up post-nasal drip, which usually causes sore throat and cough.   - If you do NOT have high blood pressure, you may use a decongestant form (D)  of this medication (ie. Claritin- D, zyrtec-D, allegra-D) or if you do not take the D form, you can take sudafed (pseudoephedrine) over the counter, which is a decongestant. Do NOT take two decongestant (D) medications at the same time (such as mucinex-D and claritin-D or plain sudafed and claritin D)    - You can use Flonase (fluticasone) nasal spray as directed for sinus congestion and postnasal drip. This is a steroid nasal spray that works locally over time to decrease the inflammation in your nose/sinuses and help with allergic symptoms. This is not an quick- relief spray like afrin, but it works well if used daily.  Discontinue if you develop nose bleed  - use OTC nasal saline prior to Flonase.  - you can use OTC nasal saline such as Ocean Spray Nasal Saline 1-3 puffs each nostril every 2-3 hours then blow out onto tissue. This is to irrigate the nasal passage way to clear the sinus openings. Use until sinus problem resolved.    - you can take plain OTC Mucinex (guaifenesin) 1200 mg twice a day to help loosen mucous.     -warm salt water gargles can help with sore throat    - warm tea with honey can help with cough. Honey is a natural cough suppressant.    - Dextromethorphan (DM) is a cough suppressant over the  counter (ie. mucinex DM, robitussin, delsym; dayquil/nyquil has DM as well.)    - Follow up with your PCP or specialty clinic as directed in the next 1-2 weeks if not improved or as needed.  You can call [phone] to schedule an appointment with the appropriate provider.      - Go to the ER if you develop new or worsening symptoms.     - You must understand that you have received an Urgent Care treatment only and that you may be released before all of your medical problems are known or treated.   - You, the patient, will arrange for follow up care as instructed.   - If your condition worsens or fails to improve we recommend that you receive another evaluation at the ER immediately or contact your PCP to discuss your concerns or return here.     Elevated Blood Pressure  Your blood pressure was elevated during your visit to the urgent care.  It was not so high that immediate care was needed but it is recommended that you monitor your blood pressure over the next week or two to make sure that it is not staying elevated.  Please have your blood pressure taken 2-3 times daily at different times of the day.  Write all of those blood pressures down and record the time that they were taken.  Keep all that information and take it with you to see your Primary Care Physician.  If your blood pressure is consistently above 140/90 you will need to follow up with your PCP more quickly

## 2024-05-24 RX ORDER — ROSUVASTATIN CALCIUM 5 MG/1
5 TABLET, COATED ORAL
Qty: 90 TABLET | Refills: 0 | Status: SHIPPED | OUTPATIENT
Start: 2024-05-24

## 2024-05-24 NOTE — TELEPHONE ENCOUNTER
Refill Routing Note   Medication(s) are not appropriate for processing by [facility] for the following reason(s):        Responsible provider unclear    [facility] action(s):  Defer               Appointments  past 12m or future 3m with PCP    Date Provider   Last Visit   Visit date not found Kristi Mckeon MD   Next Visit   Visit date not found Kristi Mckeon MD   ED visits in past 90 days: 0        Note composed:8:12 AM 05/24/2024

## 2024-07-12 ENCOUNTER — HOSPITAL ENCOUNTER (OUTPATIENT)
Dept: RADIOLOGY | Facility: HOSPITAL | Age: 54
Discharge: HOME | End: 2024-07-12
Attending: NURSE PRACTITIONER
Payer: COMMERCIAL

## 2024-07-12 ENCOUNTER — OFFICE VISIT (OUTPATIENT)
Dept: INTERNAL MEDICINE | Facility: CLINIC | Age: 54
End: 2024-07-12
Payer: COMMERCIAL

## 2024-07-12 VITALS
OXYGEN SATURATION: 98 % | WEIGHT: 188.06 LBS | DIASTOLIC BLOOD PRESSURE: 78 MMHG | HEART RATE: 69 BPM | HEIGHT: 67 IN | BODY MASS INDEX: 29.52 KG/M2 | SYSTOLIC BLOOD PRESSURE: 126 MMHG

## 2024-07-12 DIAGNOSIS — R74.8 ELEVATED LIVER ENZYMES: ICD-10-CM

## 2024-07-12 DIAGNOSIS — M72.2 PLANTAR FIBROMATOSIS: ICD-10-CM

## 2024-07-12 DIAGNOSIS — M24.541 CONTRACTURE OF HAND JOINT, RIGHT: Primary | ICD-10-CM

## 2024-07-12 DIAGNOSIS — I10 HYPERTENSION, UNSPECIFIED TYPE: ICD-10-CM

## 2024-07-12 DIAGNOSIS — F41.1 GENERALIZED ANXIETY DISORDER: ICD-10-CM

## 2024-07-12 DIAGNOSIS — E78.5 DYSLIPIDEMIA: ICD-10-CM

## 2024-07-12 DIAGNOSIS — F43.23 ADJUSTMENT DISORDER WITH MIXED ANXIETY AND DEPRESSED MOOD: ICD-10-CM

## 2024-07-12 DIAGNOSIS — K42.9 UMBILICAL HERNIA WITHOUT OBSTRUCTION AND WITHOUT GANGRENE: ICD-10-CM

## 2024-07-12 PROCEDURE — 76705 ECHO EXAM OF ABDOMEN: CPT | Mod: TC

## 2024-07-12 PROCEDURE — 76705 ECHO EXAM OF ABDOMEN: CPT | Mod: 26,,, | Performed by: RADIOLOGY

## 2024-07-12 PROCEDURE — 99214 OFFICE O/P EST MOD 30 MIN: CPT | Mod: S$GLB,,, | Performed by: NURSE PRACTITIONER

## 2024-07-12 PROCEDURE — 99999 PR PBB SHADOW E&M-EST. PATIENT-LVL V: CPT | Mod: PBBFAC,,, | Performed by: NURSE PRACTITIONER

## 2024-07-12 PROCEDURE — 1159F MED LIST DOCD IN RCRD: CPT | Mod: CPTII,S$GLB,, | Performed by: NURSE PRACTITIONER

## 2024-07-12 PROCEDURE — 3078F DIAST BP <80 MM HG: CPT | Mod: CPTII,S$GLB,, | Performed by: NURSE PRACTITIONER

## 2024-07-12 PROCEDURE — 3074F SYST BP LT 130 MM HG: CPT | Mod: CPTII,S$GLB,, | Performed by: NURSE PRACTITIONER

## 2024-07-12 PROCEDURE — 76705 ECHO EXAM OF ABDOMEN: CPT | Mod: 26,76,, | Performed by: RADIOLOGY

## 2024-07-12 PROCEDURE — 3008F BODY MASS INDEX DOCD: CPT | Mod: CPTII,S$GLB,, | Performed by: NURSE PRACTITIONER

## 2024-07-12 RX ORDER — FLUOXETINE 10 MG/1
10 CAPSULE ORAL DAILY
Qty: 30 CAPSULE | Refills: 11 | Status: SHIPPED | OUTPATIENT
Start: 2024-07-12 | End: 2025-07-12

## 2024-07-12 RX ORDER — METHOCARBAMOL 500 MG/1
500 TABLET, FILM COATED ORAL
COMMUNITY

## 2024-07-12 RX ORDER — NAPROXEN 500 MG/1
500 TABLET ORAL 2 TIMES DAILY PRN
COMMUNITY

## 2024-07-12 NOTE — PROGRESS NOTES
INTERNAL MEDICINE URGENT CARE NOTE    CHIEF COMPLAINT     Chief Complaint   Patient presents with    Anxiety    Hand Injury     Hand/fingers/feet       HPI     Matt Manley is a 53 y.o. male with HLD, and HTN who presents for an urgent visit today.    Here with multiple complaints:    Decreased hand  - noticed when he plays tennis he cannot  the racket well     2. Contracture of the right hand - acute shortening of the right thumb tendon with locking of the right thumb and pain up to the right arm    -seen at ED after this happened. Given muscle relaxer.   -palpable cord    -similar area to the right 4th finger x 5 years; using stretches to reduce     3. 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 had right plantar fibroma removed in past     4. Excessive sweating when driving or passenger - will get sweaty palms in the car.     5. Anxiety - has generalized anxiety but recent increase r/t health and driving     6. 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 Authorizing Provider   amLODIPine (NORVASC) 2.5 MG tablet Take 1 tablet (2.5 mg total) by mouth once daily. 9/26/23 9/25/24  Pret-Addie Chang NP   rosuvastatin (CRESTOR) 5 MG tablet TAKE 1 TABLET(5 MG) BY MOUTH EVERY DAY 5/24/24   Pret-Addie Chang NP       Review of Systems:  Review of Systems   Constitutional:  Negative for activity change and unexpected weight change.   HENT:  Negative for hearing loss, rhinorrhea and trouble swallowing.    Eyes:  Negative for discharge and visual disturbance.   Respiratory:  Negative for chest tightness and wheezing.    Cardiovascular:  Negative for chest pain and palpitations.  "  Gastrointestinal:  Negative for blood in stool, constipation, diarrhea and vomiting.   Endocrine: Negative for polydipsia and polyuria.   Genitourinary:  Negative for difficulty urinating, hematuria and urgency.   Musculoskeletal:  Positive for neck pain. Negative for arthralgias and joint swelling.   Neurological:  Positive for weakness. Negative for headaches.   Psychiatric/Behavioral:  Negative for confusion and dysphoric mood.        Health Maintainence:   Immunizations:  Health Maintenance         Date Due Completion Date    COVID-19 Vaccine (4 - 2023-24 season) 09/01/2023 12/30/2021    Influenza Vaccine (1) 09/01/2024 11/15/2023    Hemoglobin A1c (Diabetic Prevention Screening) 08/29/2026 8/29/2023    Lipid Panel 01/08/2029 1/8/2024    TETANUS VACCINE 08/28/2030 8/28/2020    Colorectal Cancer Screening 11/12/2030 11/12/2020             PHYSICAL EXAM     /78 (BP Location: Right arm, Patient Position: Sitting, BP Method: Medium (Manual))   Pulse 69   Ht 5' 7" (1.702 m)   Wt 85.3 kg (188 lb 0.8 oz)   SpO2 98%   BMI 29.45 kg/m²     Physical Exam  Vitals reviewed.   Constitutional:       Appearance: He is well-developed.   HENT:      Head: Normocephalic.      Right Ear: External ear normal.      Left Ear: External ear normal.      Nose: Nose normal.      Mouth/Throat:      Pharynx: No oropharyngeal exudate.   Eyes:      Pupils: Pupils are equal, round, and reactive to light.   Neck:      Thyroid: No thyromegaly.      Vascular: No JVD.      Trachea: No tracheal deviation.   Cardiovascular:      Rate and Rhythm: Normal rate and regular rhythm.      Heart sounds: No murmur heard.     No friction rub. No gallop.   Pulmonary:      Effort: No respiratory distress.      Breath sounds: Normal breath sounds. No wheezing or rales.   Chest:      Chest wall: No tenderness.   Abdominal:      General: Bowel sounds are normal. There is no distension.      Palpations: Abdomen is soft.      Tenderness: There is no " abdominal tenderness.      Hernia: A hernia is present. Hernia is present in the umbilical area and ventral area.       Musculoskeletal:         General: No tenderness. Normal range of motion.        Hands:         Feet:    Lymphadenopathy:      Cervical: No cervical adenopathy.   Skin:     General: Skin is warm and dry.      Findings: No rash.   Neurological:      Mental Status: He is alert and oriented to person, place, and time.   Psychiatric:         Behavior: Behavior normal.         LABS     Lab Results   Component Value Date    HGBA1C 5.5 08/29/2023     CMP  Sodium   Date Value Ref Range Status   08/29/2023 138 136 - 145 mmol/L Final     Potassium   Date Value Ref Range Status   08/29/2023 4.2 3.5 - 5.1 mmol/L Final     Chloride   Date Value Ref Range Status   08/29/2023 103 95 - 110 mmol/L Final     CO2   Date Value Ref Range Status   08/29/2023 26 23 - 29 mmol/L Final     Glucose   Date Value Ref Range Status   08/29/2023 115 (H) 70 - 110 mg/dL Final     BUN   Date Value Ref Range Status   08/29/2023 18 6 - 20 mg/dL Final     Creatinine   Date Value Ref Range Status   08/29/2023 1.1 0.5 - 1.4 mg/dL Final     Calcium   Date Value Ref Range Status   08/29/2023 9.5 8.7 - 10.5 mg/dL Final     Total Protein   Date Value Ref Range Status   01/30/2024 6.8 6.0 - 8.4 g/dL Final     Albumin   Date Value Ref Range Status   01/30/2024 4.1 3.5 - 5.2 g/dL Final     Total Bilirubin   Date Value Ref Range Status   01/30/2024 0.8 0.1 - 1.0 mg/dL Final     Comment:     For infants and newborns, interpretation of results should be based  on gestational age, weight and in agreement with clinical  observations.    Premature Infant recommended reference ranges:  Up to 24 hours.............<8.0 mg/dL  Up to 48 hours............<12.0 mg/dL  3-5 days..................<15.0 mg/dL  6-29 days.................<15.0 mg/dL       Alkaline Phosphatase   Date Value Ref Range Status   01/30/2024 93 55 - 135 U/L Final     AST   Date Value Ref  Range Status   01/30/2024 28 10 - 40 U/L Final     ALT   Date Value Ref Range Status   01/30/2024 45 (H) 10 - 44 U/L Final     Anion Gap   Date Value Ref Range Status   08/29/2023 9 8 - 16 mmol/L Final     eGFR if    Date Value Ref Range Status   08/28/2020 >60.0 >60 mL/min/1.73 m^2 Final     eGFR if non    Date Value Ref Range Status   08/28/2020 >60.0 >60 mL/min/1.73 m^2 Final     Comment:     Calculation used to obtain the estimated glomerular filtration  rate (eGFR) is the CKD-EPI equation.        Lab Results   Component Value Date    WBC 4.14 08/29/2023    HGB 16.4 08/29/2023    HCT 49.8 08/29/2023    MCV 92 08/29/2023     08/29/2023     Lab Results   Component Value Date    CHOL 219 (H) 01/08/2024    CHOL 271 (H) 08/29/2023    CHOL 247 (H) 08/28/2020     Lab Results   Component Value Date    HDL 63 01/08/2024    HDL 64 08/29/2023    HDL 54 08/28/2020     Lab Results   Component Value Date    LDLCALC 125.6 01/08/2024    LDLCALC 182.4 (H) 08/29/2023    LDLCALC 167.6 (H) 08/28/2020     Lab Results   Component Value Date    TRIG 152 (H) 01/08/2024    TRIG 123 08/29/2023    TRIG 127 08/28/2020     Lab Results   Component Value Date    CHOLHDL 28.8 01/08/2024    CHOLHDL 23.6 08/29/2023    CHOLHDL 21.9 08/28/2020     Lab Results   Component Value Date    TSH 1.520 08/29/2023       ASSESSMENT/PLAN     Matt Manley is a 53 y.o. male     Contracture of hand joint, right- refer to hand specialist   -     Ambulatory referral/consult to Orthopedics; Future; Expected date: 07/19/2024    Adjustment disorder with mixed anxiety and depressed mood- stable. Will start fluoxetine 10mg     Hypertension, unspecified type- stable. Will cont amlodipine 2.5. consider changing to a b-blocker     Dyslipidemia- stable. Will cont crestor     Generalized anxiety disorder with hyper hydrosis- will start fluoxetine 10mg daily   -     FLUoxetine 10 MG capsule; Take 1 capsule (10 mg total) by mouth  once daily.  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 Patient was counseled on when and how to seek emergent care.       Addie PEREZ, RUSTY, FNP-c   Department of Internal Medicine - [facility]  9:51 AM

## 2024-07-14 PROBLEM — F41.1 GENERALIZED ANXIETY DISORDER: Status: ACTIVE | Noted: 2024-07-14

## 2024-07-15 ENCOUNTER — PATIENT MESSAGE (OUTPATIENT)
Dept: INTERNAL MEDICINE | Facility: CLINIC | Age: 54
End: 2024-07-15
Payer: COMMERCIAL

## 2024-07-15 DIAGNOSIS — F41.0 PANIC ATTACKS: Primary | ICD-10-CM

## 2024-07-16 RX ORDER — PROPRANOLOL HYDROCHLORIDE 20 MG/1
20 TABLET ORAL 2 TIMES DAILY PRN
Qty: 60 TABLET | Refills: 3 | Status: SHIPPED | OUTPATIENT
Start: 2024-07-16 | End: 2025-07-16

## 2024-08-01 DIAGNOSIS — I10 HYPERTENSION, UNSPECIFIED TYPE: ICD-10-CM

## 2024-08-01 RX ORDER — AMLODIPINE BESYLATE 2.5 MG/1
2.5 TABLET ORAL
Qty: 90 TABLET | Refills: 0 | Status: SHIPPED | OUTPATIENT
Start: 2024-08-01

## 2024-08-12 ENCOUNTER — PATIENT MESSAGE (OUTPATIENT)
Dept: INTERNAL MEDICINE | Facility: CLINIC | Age: 54
End: 2024-08-12
Payer: COMMERCIAL

## 2024-08-23 ENCOUNTER — OFFICE VISIT (OUTPATIENT)
Dept: INTERNAL MEDICINE | Facility: CLINIC | Age: 54
End: 2024-08-23
Payer: COMMERCIAL

## 2024-08-23 VITALS — SYSTOLIC BLOOD PRESSURE: 138 MMHG | DIASTOLIC BLOOD PRESSURE: 87 MMHG

## 2024-08-23 DIAGNOSIS — M72.2 PLANTAR FIBROMATOSIS: ICD-10-CM

## 2024-08-23 DIAGNOSIS — I10 HYPERTENSION, UNSPECIFIED TYPE: Primary | ICD-10-CM

## 2024-08-23 DIAGNOSIS — F43.23 ADJUSTMENT DISORDER WITH MIXED ANXIETY AND DEPRESSED MOOD: ICD-10-CM

## 2024-08-23 DIAGNOSIS — M24.541 CONTRACTURE OF HAND JOINT, RIGHT: ICD-10-CM

## 2024-08-23 DIAGNOSIS — F41.1 GENERALIZED ANXIETY DISORDER: ICD-10-CM

## 2024-08-23 DIAGNOSIS — E78.5 DYSLIPIDEMIA: ICD-10-CM

## 2024-08-23 PROCEDURE — 3079F DIAST BP 80-89 MM HG: CPT | Mod: CPTII,95,, | Performed by: NURSE PRACTITIONER

## 2024-08-23 PROCEDURE — 99214 OFFICE O/P EST MOD 30 MIN: CPT | Mod: 95,,, | Performed by: NURSE PRACTITIONER

## 2024-08-23 PROCEDURE — 3075F SYST BP GE 130 - 139MM HG: CPT | Mod: CPTII,95,, | Performed by: NURSE PRACTITIONER

## 2024-08-23 NOTE — PROGRESS NOTES
INTERNAL MEDICINE PROGRESS NOTE    CHIEF COMPLAINT     Chief Complaint   Patient presents with    Anxiety       HPI     Matt Manley is a 54 y.o. male  with anxiety, HLD and HTN who presents for a follow up visit today.    The patient location is:   The chief complaint leading to consultation is: anxiety and joint pain     Visit type: audiovisual    Face to Face time with patient: 25 minutes of total time spent on the encounter, which includes face to face time and non-face to face time preparing to see the patient (eg, review of tests), Obtaining and/or reviewing separately obtained history, Documenting clinical information in the electronic or other health record, Independently interpreting results (not separately reported) and communicating results to the patient/family/caregiver, or Care coordination (not separately reported).         Each patient to whom he or she provides medical services by telemedicine is:  (1) informed of the relationship between the physician and patient and the respective role of any other health care provider with respect to management of the patient; and (2) notified that he or she may decline to receive medical services by telemedicine and may withdraw from such care at any time.    Notes:      Plantar pain- Seen by Giovanna Huggins (podiatry) - seen by PT for 30 sessions   Neck pain - using PT and traction   Anxiety- using fluoxetine 10mg - has noticed improvement   HTN- amlodipine - working well - BP this morning 138/87        Past Medical History:  Past Medical History:   Diagnosis Date    Anxiety     Hypertension 9/26/2023       Home Medications:  Prior to Admission medications    Medication Sig Start Date End Date Taking? Authorizing Provider   amLODIPine (NORVASC) 2.5 MG tablet TAKE 1 TABLET(2.5 MG) BY MOUTH EVERY DAY 8/1/24   Addie Rivas NP   FLUoxetine 10 MG capsule Take 1 capsule (10 mg total) by mouth once daily. 7/12/24 7/12/25  Addie Rivas NP    methocarbamoL (ROBAXIN) 500 MG Tab Take 500 mg by mouth.    Provider, Historical   naproxen (NAPROSYN) 500 MG tablet Take 500 mg by mouth 2 (two) times daily as needed.    Provider, Historical   propranoloL (INDERAL) 20 MG tablet Take 1 tablet (20 mg total) by mouth 2 (two) times daily as needed (anxiety). 7/16/24 7/16/25  PretAddie Carranza NP   rosuvastatin (CRESTOR) 5 MG tablet TAKE 1 TABLET(5 MG) BY MOUTH EVERY DAY 5/24/24   PretAddie Carranza NP       Review of Systems:  Review of Systems   Constitutional:  Negative for activity change and unexpected weight change.   HENT:  Negative for hearing loss, rhinorrhea and trouble swallowing.    Eyes:  Negative for discharge and visual disturbance.   Respiratory:  Negative for chest tightness and wheezing.    Cardiovascular:  Negative for chest pain and palpitations.   Gastrointestinal:  Negative for blood in stool, constipation, diarrhea and vomiting.   Endocrine: Negative for polydipsia and polyuria.   Genitourinary:  Negative for difficulty urinating, hematuria and urgency.   Musculoskeletal:  Positive for neck pain. Negative for arthralgias and joint swelling.   Neurological:  Negative for weakness and headaches.   Psychiatric/Behavioral:  Negative for confusion and dysphoric mood.        Health Maintainence:   Immunizations:  Health Maintenance         Date Due Completion Date    COVID-19 Vaccine (4 - 2023-24 season) 09/01/2023 12/30/2021    Influenza Vaccine (1) 09/01/2024 11/15/2023    Hemoglobin A1c (Diabetic Prevention Screening) 08/29/2026 8/29/2023    Lipid Panel 01/08/2029 1/8/2024    TETANUS VACCINE 08/28/2030 8/28/2020    Colorectal Cancer Screening 11/12/2030 11/12/2020             PHYSICAL EXAM     /87     Physical Exam  Constitutional:       Appearance: Normal appearance.   Pulmonary:      Effort: No respiratory distress.   Neurological:      Mental Status: He is alert and oriented to person, place, and time.         LABS     Lab  Results   Component Value Date    HGBA1C 5.5 08/29/2023     CMP  Sodium   Date Value Ref Range Status   08/29/2023 138 136 - 145 mmol/L Final     Potassium   Date Value Ref Range Status   08/29/2023 4.2 3.5 - 5.1 mmol/L Final     Chloride   Date Value Ref Range Status   08/29/2023 103 95 - 110 mmol/L Final     CO2   Date Value Ref Range Status   08/29/2023 26 23 - 29 mmol/L Final     Glucose   Date Value Ref Range Status   08/29/2023 115 (H) 70 - 110 mg/dL Final     BUN   Date Value Ref Range Status   08/29/2023 18 6 - 20 mg/dL Final     Creatinine   Date Value Ref Range Status   08/29/2023 1.1 0.5 - 1.4 mg/dL Final     Calcium   Date Value Ref Range Status   08/29/2023 9.5 8.7 - 10.5 mg/dL Final     Total Protein   Date Value Ref Range Status   01/30/2024 6.8 6.0 - 8.4 g/dL Final     Albumin   Date Value Ref Range Status   01/30/2024 4.1 3.5 - 5.2 g/dL Final     Total Bilirubin   Date Value Ref Range Status   01/30/2024 0.8 0.1 - 1.0 mg/dL Final     Comment:     For infants and newborns, interpretation of results should be based  on gestational age, weight and in agreement with clinical  observations.    Premature Infant recommended reference ranges:  Up to 24 hours.............<8.0 mg/dL  Up to 48 hours............<12.0 mg/dL  3-5 days..................<15.0 mg/dL  6-29 days.................<15.0 mg/dL       Alkaline Phosphatase   Date Value Ref Range Status   01/30/2024 93 55 - 135 U/L Final     AST   Date Value Ref Range Status   01/30/2024 28 10 - 40 U/L Final     ALT   Date Value Ref Range Status   01/30/2024 45 (H) 10 - 44 U/L Final     Anion Gap   Date Value Ref Range Status   08/29/2023 9 8 - 16 mmol/L Final     eGFR if    Date Value Ref Range Status   08/28/2020 >60.0 >60 mL/min/1.73 m^2 Final     eGFR if non    Date Value Ref Range Status   08/28/2020 >60.0 >60 mL/min/1.73 m^2 Final     Comment:     Calculation used to obtain the estimated glomerular filtration  rate (eGFR)  is the CKD-EPI equation.        Lab Results   Component Value Date    WBC 4.14 08/29/2023    HGB 16.4 08/29/2023    HCT 49.8 08/29/2023    MCV 92 08/29/2023     08/29/2023     Lab Results   Component Value Date    CHOL 219 (H) 01/08/2024    CHOL 271 (H) 08/29/2023    CHOL 247 (H) 08/28/2020     Lab Results   Component Value Date    HDL 63 01/08/2024    HDL 64 08/29/2023    HDL 54 08/28/2020     Lab Results   Component Value Date    LDLCALC 125.6 01/08/2024    LDLCALC 182.4 (H) 08/29/2023    LDLCALC 167.6 (H) 08/28/2020     Lab Results   Component Value Date    TRIG 152 (H) 01/08/2024    TRIG 123 08/29/2023    TRIG 127 08/28/2020     Lab Results   Component Value Date    CHOLHDL 28.8 01/08/2024    CHOLHDL 23.6 08/29/2023    CHOLHDL 21.9 08/28/2020     Lab Results   Component Value Date    TSH 1.520 08/29/2023       ASSESSMENT/PLAN     Matt Manley is a 54 y.o. male     Hypertension, unspecified type- stable. Will cont amlodipine and monitor BP     Dyslipidemia- stable. Will cont current meds and monitor. Low cholesterol diet     Adjustment disorder with mixed anxiety and depressed mood/Generalized anxiety disorder- stable on prozac. Will cont and recommended therapy     Plantar fibromatosis- stable. Will cont PT     Contracture of hand joint, right- stable. Will cont PT       Follow up with PCP in 1 month     Patient education provided from [facility]. Patient was counseled on when and how to seek emergent care.       Addie PEREZ, APRN, FNP-c   Department of Internal Medicine - [facility]  7:58 AM

## 2024-08-28 ENCOUNTER — TELEPHONE (OUTPATIENT)
Dept: PHARMACY | Facility: CLINIC | Age: 54
End: 2024-08-28
Payer: COMMERCIAL

## 2024-08-29 ENCOUNTER — PATIENT MESSAGE (OUTPATIENT)
Dept: INTERNAL MEDICINE | Facility: CLINIC | Age: 54
End: 2024-08-29
Payer: COMMERCIAL

## 2024-08-29 DIAGNOSIS — F43.23 ADJUSTMENT DISORDER WITH MIXED ANXIETY AND DEPRESSED MOOD: ICD-10-CM

## 2024-08-29 DIAGNOSIS — F41.1 GENERALIZED ANXIETY DISORDER: Primary | ICD-10-CM

## 2024-08-29 NOTE — TELEPHONE ENCOUNTER
[facility] Chart Review & Patient Outreach Details For Medication Adherence Project    Reason for Outreach Encounter: 3rd Party payor non-compliance report ([employer], [employer], [employer], etc)  2.  Patient Outreach Method: Reviewed Patient Chart  3.   Medication in question: rosuvastatin 5 mg    LAST FILLED: 8/1/24 for 90 day supply  Hyperlipidemia Medications               rosuvastatin (CRESTOR) 5 MG tablet TAKE 1 TABLET(5 MG) BY MOUTH EVERY DAY               4.  Reviewed and or Updates Made To: Patient Chart  5. Outreach Outcomes and/or actions taken: Patient filled medication and is on track to be adherent

## 2024-10-12 DIAGNOSIS — F41.0 PANIC ATTACKS: ICD-10-CM

## 2024-10-14 RX ORDER — PROPRANOLOL HYDROCHLORIDE 20 MG/1
TABLET ORAL
Qty: 180 TABLET | Refills: 0 | Status: SHIPPED | OUTPATIENT
Start: 2024-10-14

## 2024-11-04 RX ORDER — ROSUVASTATIN CALCIUM 5 MG/1
5 TABLET, COATED ORAL
Qty: 90 TABLET | Refills: 0 | Status: SHIPPED | OUTPATIENT
Start: 2024-11-04

## 2024-11-12 ENCOUNTER — TELEPHONE (OUTPATIENT)
Dept: PHARMACY | Facility: CLINIC | Age: 54
End: 2024-11-12
Payer: COMMERCIAL

## 2024-11-12 NOTE — TELEPHONE ENCOUNTER
[facility] Chart Review & Patient Outreach Details For Medication Adherence Project    Reason for Outreach Encounter: 3rd Party payor non-compliance report ([employer], [employer], C, etc)  2.  Patient Outreach Method: Reviewed Patient Chart  3.   Medication in question: rosuvastatin    LAST FILLED: 11/4/24 for 90 day supply  Hyperlipidemia Medications               rosuvastatin (CRESTOR) 5 MG tablet TAKE 1 TABLET(5 MG) BY MOUTH EVERY DAY               4.  Reviewed and or Updates Made To: Patient Chart  5. Outreach Outcomes and/or actions taken: Patient filled medication and is on track to be adherent

## 2025-01-20 DIAGNOSIS — F41.0 PANIC ATTACKS: ICD-10-CM

## 2025-01-21 RX ORDER — PROPRANOLOL HYDROCHLORIDE 20 MG/1
TABLET ORAL
Qty: 180 TABLET | Refills: 0 | Status: SHIPPED | OUTPATIENT
Start: 2025-01-21

## 2025-02-06 RX ORDER — ROSUVASTATIN CALCIUM 5 MG/1
5 TABLET, COATED ORAL
Qty: 90 TABLET | Refills: 1 | Status: SHIPPED | OUTPATIENT
Start: 2025-02-06

## 2025-02-20 ENCOUNTER — TELEPHONE (OUTPATIENT)
Dept: PHARMACY | Facility: CLINIC | Age: 55
End: 2025-02-20
Payer: COMMERCIAL

## 2025-02-20 NOTE — TELEPHONE ENCOUNTER
[facility] Chart Review & Patient Outreach Details For Medication Adherence Project    Reason for Outreach Encounter: 3rd Party payor non-compliance report ([employer], [employer], C, etc)  2.  Patient Outreach Method: Reviewed Patient Chart  3.   Medication in question: rosuvastatin    LAST FILLED: 2/6/25 for 90 day supply  Hyperlipidemia Medications              rosuvastatin (CRESTOR) 5 MG tablet TAKE 1 TABLET(5 MG) BY MOUTH EVERY DAY               4.  Reviewed and or Updates Made To: Patient Chart  5. Outreach Outcomes and/or actions taken: Patient filled medication and is on track to be adherent

## 2025-02-22 DIAGNOSIS — I10 HYPERTENSION, UNSPECIFIED TYPE: ICD-10-CM

## 2025-02-24 RX ORDER — AMLODIPINE BESYLATE 2.5 MG/1
2.5 TABLET ORAL
Qty: 90 TABLET | Refills: 1 | Status: SHIPPED | OUTPATIENT
Start: 2025-02-24

## 2025-04-21 DIAGNOSIS — F41.0 PANIC ATTACKS: ICD-10-CM

## 2025-04-22 NOTE — TELEPHONE ENCOUNTER
Refill Routing Note   Medication(s) are not appropriate for processing by [facility] for the following reason(s):        Responsible provider unclear    [facility] action(s):  Defer               Appointments  past 12m or future 3m with PCP    Date Provider   Last Visit   Visit date not found Adriana Head MD   Next Visit   Visit date not found Adriana Head MD   ED visits in past 90 days: 0        Note composed:8:07 PM 04/21/2025

## 2025-04-23 RX ORDER — PROPRANOLOL HYDROCHLORIDE 20 MG/1
TABLET ORAL
Qty: 180 TABLET | Refills: 0 | Status: SHIPPED | OUTPATIENT
Start: 2025-04-23

## 2025-05-15 RX ORDER — ROSUVASTATIN CALCIUM 5 MG/1
5 TABLET, COATED ORAL
Qty: 90 TABLET | Refills: 1 | Status: SHIPPED | OUTPATIENT
Start: 2025-05-15

## 2025-07-24 DIAGNOSIS — F41.0 PANIC ATTACKS: ICD-10-CM

## 2025-07-24 RX ORDER — PROPRANOLOL HYDROCHLORIDE 20 MG/1
20 TABLET ORAL 2 TIMES DAILY PRN
Qty: 180 TABLET | Refills: 0 | Status: SHIPPED | OUTPATIENT
Start: 2025-07-24

## 2025-07-24 NOTE — TELEPHONE ENCOUNTER
Copied from CRM #[id number]. Topic: Medications - New Medication Request  >> Jul 24, 2025 11:59 AM Cassandra wrote:  Requesting an RX refill or new RX.    Is this a refill or new RX: New    RX name and strength (copy/paste from chart):  propranoloL (INDERAL) 20 MG tablet     Is this a 30 day or 90 day RX: 180    Pharmacy name and phone # (copy/paste from chart):    [facility][id number] [address] AT [facility] [address]  [address] [facility] [address]  Phone: [phone] Fax: [phone]     Who called and call back number: [facility] [phone]    The doctors have asked that we provide their patients with the following 2 reminders -- prescription refills can take up to 72 hours, and a friendly reminder that in the future you can use your [facility] account to request refills:

## 2025-08-17 DIAGNOSIS — F41.1 GENERALIZED ANXIETY DISORDER: ICD-10-CM

## 2025-08-18 RX ORDER — FLUOXETINE 10 MG/1
10 CAPSULE ORAL DAILY
Qty: 30 CAPSULE | Refills: 0 | Status: SHIPPED | OUTPATIENT
Start: 2025-08-18 | End: 2026-08-18

## 2025-09-04 ENCOUNTER — OFFICE VISIT (OUTPATIENT)
Dept: INTERNAL MEDICINE | Facility: CLINIC | Age: 55
End: 2025-09-04
Payer: COMMERCIAL

## 2025-09-04 VITALS
HEART RATE: 61 BPM | HEIGHT: 67 IN | DIASTOLIC BLOOD PRESSURE: 82 MMHG | SYSTOLIC BLOOD PRESSURE: 130 MMHG | BODY MASS INDEX: 30.38 KG/M2 | WEIGHT: 193.56 LBS

## 2025-09-04 DIAGNOSIS — Z00.00 ANNUAL PHYSICAL EXAM: Primary | ICD-10-CM

## 2025-09-04 DIAGNOSIS — E66.811 CLASS 1 OBESITY WITH BODY MASS INDEX (BMI) OF 30.0 TO 30.9 IN ADULT, UNSPECIFIED OBESITY TYPE, UNSPECIFIED WHETHER SERIOUS COMORBIDITY PRESENT: ICD-10-CM

## 2025-09-04 DIAGNOSIS — F43.23 ADJUSTMENT DISORDER WITH MIXED ANXIETY AND DEPRESSED MOOD: ICD-10-CM

## 2025-09-04 DIAGNOSIS — F41.1 GENERALIZED ANXIETY DISORDER: ICD-10-CM

## 2025-09-04 DIAGNOSIS — E78.5 DYSLIPIDEMIA: ICD-10-CM

## 2025-09-04 DIAGNOSIS — M72.2 PLANTAR FIBROMATOSIS: ICD-10-CM

## 2025-09-04 DIAGNOSIS — I10 HYPERTENSION, UNSPECIFIED TYPE: ICD-10-CM

## 2025-09-04 DIAGNOSIS — F41.0 PANIC ATTACKS: ICD-10-CM

## 2025-09-04 PROCEDURE — 3075F SYST BP GE 130 - 139MM HG: CPT | Mod: CPTII,S$GLB,, | Performed by: NURSE PRACTITIONER

## 2025-09-04 PROCEDURE — 3079F DIAST BP 80-89 MM HG: CPT | Mod: CPTII,S$GLB,, | Performed by: NURSE PRACTITIONER

## 2025-09-04 PROCEDURE — 99396 PREV VISIT EST AGE 40-64: CPT | Mod: S$GLB,,, | Performed by: NURSE PRACTITIONER

## 2025-09-04 PROCEDURE — 3008F BODY MASS INDEX DOCD: CPT | Mod: CPTII,S$GLB,, | Performed by: NURSE PRACTITIONER

## 2025-09-04 PROCEDURE — 99999 PR PBB SHADOW E&M-EST. PATIENT-LVL III: CPT | Mod: PBBFAC,,, | Performed by: NURSE PRACTITIONER

## 2025-09-04 PROCEDURE — 1159F MED LIST DOCD IN RCRD: CPT | Mod: CPTII,S$GLB,, | Performed by: NURSE PRACTITIONER

## 2025-09-04 RX ORDER — PROPRANOLOL HYDROCHLORIDE 20 MG/1
20 TABLET ORAL 2 TIMES DAILY PRN
Qty: 180 TABLET | Refills: 4 | Status: SHIPPED | OUTPATIENT
Start: 2025-09-04

## 2025-09-04 RX ORDER — TADALAFIL 5 MG/1
TABLET ORAL
COMMUNITY
Start: 2025-06-10

## 2025-09-04 RX ORDER — FLUOXETINE 10 MG/1
10 CAPSULE ORAL DAILY
Qty: 90 CAPSULE | Refills: 4 | Status: SHIPPED | OUTPATIENT
Start: 2025-09-04 | End: 2026-09-04

## 2025-09-04 RX ORDER — AMLODIPINE BESYLATE 2.5 MG/1
2.5 TABLET ORAL DAILY
Qty: 90 TABLET | Refills: 4 | Status: SHIPPED | OUTPATIENT
Start: 2025-09-04

## 2025-09-04 RX ORDER — ROSUVASTATIN CALCIUM 5 MG/1
5 TABLET, COATED ORAL NIGHTLY
Qty: 90 TABLET | Refills: 4 | Status: SHIPPED | OUTPATIENT
Start: 2025-09-04 | End: 2025-09-05 | Stop reason: SDUPTHER

## (undated) DEVICE — DRESSING LEUKOPLAST FLEX 1X3IN

## (undated) DEVICE — CLOSURE SKIN STERI STRIP 1/2X4

## (undated) DEVICE — ADHESIVE MASTISOL VIAL 48/BX

## (undated) DEVICE — TRAY CATH UM FOLEY SIL W 16FR

## (undated) DEVICE — SYR 30CC LUER LOCK

## (undated) DEVICE — TUBING HF INSUFFLATION W/ FLTR

## (undated) DEVICE — SUT GUT PL. 4-0 27 FS-2

## (undated) DEVICE — BLADE SURG CARBON STEEL SZ11

## (undated) DEVICE — SEE MEDLINE ITEM 157117

## (undated) DEVICE — MARKER SKIN STND TIP BLUE BARR

## (undated) DEVICE — Device

## (undated) DEVICE — APPLICATOR CHLORAPREP ORN 26ML

## (undated) DEVICE — SOL NS 1000CC

## (undated) DEVICE — SUT 0 VICRYL / UR6 (J603)

## (undated) DEVICE — GOWN SURGICAL X-LARGE

## (undated) DEVICE — TRAY MINOR GEN SURG

## (undated) DEVICE — BANDAGE ADHESIVE

## (undated) DEVICE — ELECTRODE REM PLYHSV RETURN 9

## (undated) DEVICE — STRAP SECURE 5MM

## (undated) DEVICE — NDL HYPO REG 25G X 1 1/2

## (undated) DEVICE — SEE MEDLINE ITEM 157148

## (undated) DEVICE — DISSECTOR SPACEMAKER + 10-12MM

## (undated) DEVICE — TROCAR ENDOPATH XCEL 5MM 7.5CM